# Patient Record
Sex: FEMALE | Race: WHITE | NOT HISPANIC OR LATINO | Employment: OTHER | URBAN - METROPOLITAN AREA
[De-identification: names, ages, dates, MRNs, and addresses within clinical notes are randomized per-mention and may not be internally consistent; named-entity substitution may affect disease eponyms.]

---

## 2017-02-08 ENCOUNTER — HISTORICAL (OUTPATIENT)
Dept: RADIOLOGY | Facility: HOSPITAL | Age: 67
End: 2017-02-08

## 2017-03-20 ENCOUNTER — HISTORICAL (OUTPATIENT)
Dept: RADIOLOGY | Facility: HOSPITAL | Age: 67
End: 2017-03-20

## 2017-04-03 ENCOUNTER — HISTORICAL (OUTPATIENT)
Dept: RADIOLOGY | Facility: HOSPITAL | Age: 67
End: 2017-04-03

## 2017-04-10 ENCOUNTER — HISTORICAL (OUTPATIENT)
Dept: LAB | Facility: HOSPITAL | Age: 67
End: 2017-04-10

## 2017-04-26 ENCOUNTER — HISTORICAL (OUTPATIENT)
Dept: ADMINISTRATIVE | Facility: HOSPITAL | Age: 67
End: 2017-04-26

## 2017-05-13 ENCOUNTER — HISTORICAL (OUTPATIENT)
Dept: LAB | Facility: HOSPITAL | Age: 67
End: 2017-05-13

## 2017-05-13 LAB
ALBUMIN SERPL-MCNC: 3.7 GM/DL (ref 3.4–5)
ALBUMIN/GLOB SERPL: 1.1 RATIO (ref 1.1–2)
ALP SERPL-CCNC: 100 UNIT/L (ref 46–116)
ALT SERPL-CCNC: 16 UNIT/L (ref 12–78)
AST SERPL-CCNC: 10 UNIT/L (ref 15–37)
BILIRUB SERPL-MCNC: 0.3 MG/DL (ref 0.2–1)
BILIRUBIN DIRECT+TOT PNL SERPL-MCNC: 0.07 MG/DL (ref 0–0.2)
BILIRUBIN DIRECT+TOT PNL SERPL-MCNC: 0.23 MG/DL (ref 0–0.8)
BUN SERPL-MCNC: 37 MG/DL (ref 7–18)
CALCIUM SERPL-MCNC: 9.6 MG/DL (ref 8.5–10.1)
CHLORIDE SERPL-SCNC: 104 MMOL/L (ref 98–107)
CO2 SERPL-SCNC: 26.6 MMOL/L (ref 21–32)
CREAT SERPL-MCNC: 1.03 MG/DL (ref 0.6–1.3)
EST. AVERAGE GLUCOSE BLD GHB EST-MCNC: 192 MG/DL
GLOBULIN SER-MCNC: 3.4 GM/DL (ref 2.4–3.5)
GLUCOSE SERPL-MCNC: 132 MG/DL (ref 74–106)
HBA1C MFR BLD: 8.3 % (ref 4.5–6.2)
POTASSIUM SERPL-SCNC: 5 MMOL/L (ref 3.5–5.1)
PROT SERPL-MCNC: 7.1 GM/DL (ref 6.4–8.2)
SODIUM SERPL-SCNC: 140 MMOL/L (ref 136–145)
TSH SERPL-ACNC: 0.12 MIU/ML (ref 0.36–3.74)

## 2017-06-19 ENCOUNTER — HISTORICAL (OUTPATIENT)
Dept: HEMATOLOGY/ONCOLOGY | Facility: CLINIC | Age: 67
End: 2017-06-19

## 2017-06-19 LAB
ABS NEUT (OLG): 4.09 X10(3)/MCL (ref 2.1–9.2)
ALBUMIN SERPL-MCNC: 3.4 GM/DL (ref 3.4–5)
ALBUMIN/GLOB SERPL: 1.1 RATIO (ref 1.1–2)
ALP SERPL-CCNC: 86 UNIT/L (ref 38–126)
ALT SERPL-CCNC: 18 UNIT/L (ref 12–78)
AST SERPL-CCNC: 7 UNIT/L (ref 15–37)
BASOPHILS # BLD AUTO: 0 X10(3)/MCL (ref 0–0.2)
BASOPHILS NFR BLD AUTO: 0.3 %
BILIRUB SERPL-MCNC: 0.3 MG/DL (ref 0.2–1)
BILIRUBIN DIRECT+TOT PNL SERPL-MCNC: 0.1 MG/DL (ref 0–0.5)
BILIRUBIN DIRECT+TOT PNL SERPL-MCNC: 0.2 MG/DL (ref 0–0.8)
BUN SERPL-MCNC: 35 MG/DL (ref 7–18)
CALCIUM SERPL-MCNC: 8.9 MG/DL (ref 8.5–10.1)
CHLORIDE SERPL-SCNC: 104 MMOL/L (ref 98–107)
CO2 SERPL-SCNC: 23 MMOL/L (ref 21–32)
CREAT SERPL-MCNC: 1.06 MG/DL (ref 0.55–1.02)
EOSINOPHIL # BLD AUTO: 0.2 X10(3)/MCL (ref 0–0.9)
EOSINOPHIL NFR BLD AUTO: 3.6 %
ERYTHROCYTE [DISTWIDTH] IN BLOOD BY AUTOMATED COUNT: 12 % (ref 11.5–17)
GLOBULIN SER-MCNC: 3.2 GM/DL (ref 2.4–3.5)
GLUCOSE SERPL-MCNC: 241 MG/DL (ref 74–106)
HCT VFR BLD AUTO: 30.4 % (ref 37–47)
HGB BLD-MCNC: 9.9 GM/DL (ref 12–16)
LYMPHOCYTES # BLD AUTO: 1.3 X10(3)/MCL (ref 0.6–4.6)
LYMPHOCYTES NFR BLD AUTO: 21.6 %
MCH RBC QN AUTO: 31 PG (ref 27–31)
MCHC RBC AUTO-ENTMCNC: 32.6 GM/DL (ref 33–36)
MCV RBC AUTO: 95.3 FL (ref 80–94)
MONOCYTES # BLD AUTO: 0.3 X10(3)/MCL (ref 0.1–1.3)
MONOCYTES NFR BLD AUTO: 4.9 %
NEUTROPHILS # BLD AUTO: 4.1 X10(3)/MCL (ref 2.1–9.2)
NEUTROPHILS NFR BLD AUTO: 69.6 %
PLATELET # BLD AUTO: 244 X10(3)/MCL (ref 130–400)
PMV BLD AUTO: 9.9 FL (ref 9.4–12.4)
POTASSIUM SERPL-SCNC: 4.8 MMOL/L (ref 3.5–5.1)
PROT SERPL-MCNC: 6.6 GM/DL (ref 6.4–8.2)
RBC # BLD AUTO: 3.19 X10(6)/MCL (ref 4.2–5.4)
SODIUM SERPL-SCNC: 137 MMOL/L (ref 136–145)
WBC # SPEC AUTO: 5.9 X10(3)/MCL (ref 4.5–11.5)

## 2017-06-26 ENCOUNTER — HISTORICAL (OUTPATIENT)
Dept: HEMATOLOGY/ONCOLOGY | Facility: CLINIC | Age: 67
End: 2017-06-26

## 2017-07-25 ENCOUNTER — HISTORICAL (OUTPATIENT)
Dept: RADIATION THERAPY | Facility: HOSPITAL | Age: 67
End: 2017-07-25

## 2017-08-02 ENCOUNTER — HISTORICAL (OUTPATIENT)
Dept: RADIATION THERAPY | Facility: HOSPITAL | Age: 67
End: 2017-08-02

## 2017-08-07 ENCOUNTER — HISTORICAL (OUTPATIENT)
Dept: RADIATION THERAPY | Facility: HOSPITAL | Age: 67
End: 2017-08-07

## 2017-08-08 ENCOUNTER — HISTORICAL (OUTPATIENT)
Dept: RADIATION THERAPY | Facility: HOSPITAL | Age: 67
End: 2017-08-08

## 2017-08-10 ENCOUNTER — HISTORICAL (OUTPATIENT)
Dept: RADIATION THERAPY | Facility: HOSPITAL | Age: 67
End: 2017-08-10

## 2017-08-14 ENCOUNTER — HISTORICAL (OUTPATIENT)
Dept: RADIATION THERAPY | Facility: HOSPITAL | Age: 67
End: 2017-08-14

## 2017-08-15 ENCOUNTER — HISTORICAL (OUTPATIENT)
Dept: RADIATION THERAPY | Facility: HOSPITAL | Age: 67
End: 2017-08-15

## 2017-08-16 ENCOUNTER — HISTORICAL (OUTPATIENT)
Dept: RADIATION THERAPY | Facility: HOSPITAL | Age: 67
End: 2017-08-16

## 2017-08-17 ENCOUNTER — HISTORICAL (OUTPATIENT)
Dept: RADIATION THERAPY | Facility: HOSPITAL | Age: 67
End: 2017-08-17

## 2017-08-18 ENCOUNTER — HISTORICAL (OUTPATIENT)
Dept: RADIATION THERAPY | Facility: HOSPITAL | Age: 67
End: 2017-08-18

## 2017-08-21 ENCOUNTER — HISTORICAL (OUTPATIENT)
Dept: RADIATION THERAPY | Facility: HOSPITAL | Age: 67
End: 2017-08-21

## 2017-08-22 ENCOUNTER — HISTORICAL (OUTPATIENT)
Dept: RADIATION THERAPY | Facility: HOSPITAL | Age: 67
End: 2017-08-22

## 2017-08-23 ENCOUNTER — HISTORICAL (OUTPATIENT)
Dept: RADIATION THERAPY | Facility: HOSPITAL | Age: 67
End: 2017-08-23

## 2017-08-24 ENCOUNTER — HISTORICAL (OUTPATIENT)
Dept: RADIATION THERAPY | Facility: HOSPITAL | Age: 67
End: 2017-08-24

## 2017-08-25 ENCOUNTER — HISTORICAL (OUTPATIENT)
Dept: RADIATION THERAPY | Facility: HOSPITAL | Age: 67
End: 2017-08-25

## 2017-08-28 ENCOUNTER — HISTORICAL (OUTPATIENT)
Dept: RADIATION THERAPY | Facility: HOSPITAL | Age: 67
End: 2017-08-28

## 2017-08-29 ENCOUNTER — HISTORICAL (OUTPATIENT)
Dept: RADIATION THERAPY | Facility: HOSPITAL | Age: 67
End: 2017-08-29

## 2017-08-30 ENCOUNTER — HISTORICAL (OUTPATIENT)
Dept: RADIATION THERAPY | Facility: HOSPITAL | Age: 67
End: 2017-08-30

## 2017-08-31 ENCOUNTER — HISTORICAL (OUTPATIENT)
Dept: RADIATION THERAPY | Facility: HOSPITAL | Age: 67
End: 2017-08-31

## 2017-09-01 ENCOUNTER — HISTORICAL (OUTPATIENT)
Dept: RADIATION THERAPY | Facility: HOSPITAL | Age: 67
End: 2017-09-01

## 2017-09-05 ENCOUNTER — HISTORICAL (OUTPATIENT)
Dept: RADIATION THERAPY | Facility: HOSPITAL | Age: 67
End: 2017-09-05

## 2017-09-06 ENCOUNTER — HISTORICAL (OUTPATIENT)
Dept: RADIATION THERAPY | Facility: HOSPITAL | Age: 67
End: 2017-09-06

## 2017-09-07 ENCOUNTER — HISTORICAL (OUTPATIENT)
Dept: RADIATION THERAPY | Facility: HOSPITAL | Age: 67
End: 2017-09-07

## 2017-09-08 ENCOUNTER — HISTORICAL (OUTPATIENT)
Dept: RADIATION THERAPY | Facility: HOSPITAL | Age: 67
End: 2017-09-08

## 2017-09-11 ENCOUNTER — HISTORICAL (OUTPATIENT)
Dept: RADIATION THERAPY | Facility: HOSPITAL | Age: 67
End: 2017-09-11

## 2017-09-12 ENCOUNTER — HISTORICAL (OUTPATIENT)
Dept: RADIATION THERAPY | Facility: HOSPITAL | Age: 67
End: 2017-09-12

## 2017-09-13 ENCOUNTER — HISTORICAL (OUTPATIENT)
Dept: RADIATION THERAPY | Facility: HOSPITAL | Age: 67
End: 2017-09-13

## 2017-12-11 ENCOUNTER — HISTORICAL (OUTPATIENT)
Dept: RADIATION THERAPY | Facility: HOSPITAL | Age: 67
End: 2017-12-11

## 2017-12-20 ENCOUNTER — HISTORICAL (OUTPATIENT)
Dept: HEMATOLOGY/ONCOLOGY | Facility: CLINIC | Age: 67
End: 2017-12-20

## 2017-12-20 LAB
ABS NEUT (OLG): 3.42 X10(3)/MCL (ref 2.1–9.2)
ALBUMIN SERPL-MCNC: 3.6 GM/DL (ref 3.4–5)
ALBUMIN/GLOB SERPL: 1.1 RATIO (ref 1.1–2)
ALP SERPL-CCNC: 85 UNIT/L (ref 38–126)
ALT SERPL-CCNC: 14 UNIT/L (ref 12–78)
AST SERPL-CCNC: 8 UNIT/L (ref 15–37)
BASOPHILS # BLD AUTO: 0 X10(3)/MCL (ref 0–0.2)
BASOPHILS NFR BLD AUTO: 0.7 %
BILIRUB SERPL-MCNC: 0.3 MG/DL (ref 0.2–1)
BILIRUBIN DIRECT+TOT PNL SERPL-MCNC: 0.1 MG/DL (ref 0–0.5)
BILIRUBIN DIRECT+TOT PNL SERPL-MCNC: 0.2 MG/DL (ref 0–0.8)
BUN SERPL-MCNC: 26 MG/DL (ref 7–18)
CALCIUM SERPL-MCNC: 8.9 MG/DL (ref 8.5–10.1)
CHLORIDE SERPL-SCNC: 102 MMOL/L (ref 98–107)
CO2 SERPL-SCNC: 23 MMOL/L (ref 21–32)
CREAT SERPL-MCNC: 0.97 MG/DL (ref 0.55–1.02)
EOSINOPHIL # BLD AUTO: 0.4 X10(3)/MCL (ref 0–0.9)
EOSINOPHIL NFR BLD AUTO: 7.5 %
ERYTHROCYTE [DISTWIDTH] IN BLOOD BY AUTOMATED COUNT: 12.5 % (ref 11.5–17)
GLOBULIN SER-MCNC: 3.2 GM/DL (ref 2.4–3.5)
GLUCOSE SERPL-MCNC: 321 MG/DL (ref 74–106)
HCT VFR BLD AUTO: 31.4 % (ref 37–47)
HGB BLD-MCNC: 10.1 GM/DL (ref 12–16)
LYMPHOCYTES # BLD AUTO: 1.3 X10(3)/MCL (ref 0.6–4.6)
LYMPHOCYTES NFR BLD AUTO: 23.4 %
MCH RBC QN AUTO: 31.4 PG (ref 27–31)
MCHC RBC AUTO-ENTMCNC: 32.2 GM/DL (ref 33–36)
MCV RBC AUTO: 97.5 FL (ref 80–94)
MONOCYTES # BLD AUTO: 0.3 X10(3)/MCL (ref 0.1–1.3)
MONOCYTES NFR BLD AUTO: 5.9 %
NEUTROPHILS # BLD AUTO: 3.4 X10(3)/MCL (ref 2.1–9.2)
NEUTROPHILS NFR BLD AUTO: 62.5 %
PLATELET # BLD AUTO: 243 X10(3)/MCL (ref 130–400)
PMV BLD AUTO: 9.3 FL (ref 9.4–12.4)
POTASSIUM SERPL-SCNC: 4.3 MMOL/L (ref 3.5–5.1)
PROT SERPL-MCNC: 6.8 GM/DL (ref 6.4–8.2)
RBC # BLD AUTO: 3.22 X10(6)/MCL (ref 4.2–5.4)
SODIUM SERPL-SCNC: 136 MMOL/L (ref 136–145)
WBC # SPEC AUTO: 5.5 X10(3)/MCL (ref 4.5–11.5)

## 2018-01-22 ENCOUNTER — HISTORICAL (OUTPATIENT)
Dept: HEMATOLOGY/ONCOLOGY | Facility: CLINIC | Age: 68
End: 2018-01-22

## 2018-01-22 LAB
ABS NEUT (OLG): 4.42 X10(3)/MCL (ref 2.1–9.2)
ALBUMIN SERPL-MCNC: 3.6 GM/DL (ref 3.4–5)
ALBUMIN/GLOB SERPL: 1 {RATIO}
ALP SERPL-CCNC: 100 UNIT/L (ref 38–126)
ALT SERPL-CCNC: 13 UNIT/L (ref 12–78)
AST SERPL-CCNC: 8 UNIT/L (ref 15–37)
BASOPHILS # BLD AUTO: 0 X10(3)/MCL (ref 0–0.2)
BASOPHILS NFR BLD AUTO: 0.3 %
BILIRUB SERPL-MCNC: 0.3 MG/DL (ref 0.2–1)
BILIRUBIN DIRECT+TOT PNL SERPL-MCNC: 0.1 MG/DL (ref 0–0.2)
BILIRUBIN DIRECT+TOT PNL SERPL-MCNC: 0.2 MG/DL (ref 0–0.8)
BUN SERPL-MCNC: 26 MG/DL (ref 7–18)
CALCIUM SERPL-MCNC: 9.1 MG/DL (ref 8.5–10.1)
CHLORIDE SERPL-SCNC: 103 MMOL/L (ref 98–107)
CO2 SERPL-SCNC: 26 MMOL/L (ref 21–32)
CREAT SERPL-MCNC: 0.97 MG/DL (ref 0.55–1.02)
EOSINOPHIL # BLD AUTO: 0.4 X10(3)/MCL (ref 0–0.9)
EOSINOPHIL NFR BLD AUTO: 6.1 %
ERYTHROCYTE [DISTWIDTH] IN BLOOD BY AUTOMATED COUNT: 12.1 % (ref 11.5–17)
GLOBULIN SER-MCNC: 3.6 GM/DL (ref 2.4–3.5)
GLUCOSE SERPL-MCNC: 183 MG/DL (ref 74–106)
HCT VFR BLD AUTO: 33 % (ref 37–47)
HGB BLD-MCNC: 10.6 GM/DL (ref 12–16)
LYMPHOCYTES # BLD AUTO: 1 X10(3)/MCL (ref 0.6–4.6)
LYMPHOCYTES NFR BLD AUTO: 16.9 %
MCH RBC QN AUTO: 31.2 PG (ref 27–31)
MCHC RBC AUTO-ENTMCNC: 32.1 GM/DL (ref 33–36)
MCV RBC AUTO: 97.1 FL (ref 80–94)
MONOCYTES # BLD AUTO: 0.3 X10(3)/MCL (ref 0.1–1.3)
MONOCYTES NFR BLD AUTO: 5.5 %
NEUTROPHILS # BLD AUTO: 4.4 X10(3)/MCL (ref 2.1–9.2)
NEUTROPHILS NFR BLD AUTO: 71.2 %
PLATELET # BLD AUTO: 249 X10(3)/MCL (ref 130–400)
PMV BLD AUTO: 9.3 FL (ref 9.4–12.4)
POTASSIUM SERPL-SCNC: 4.5 MMOL/L (ref 3.5–5.1)
PROT SERPL-MCNC: 7.2 GM/DL (ref 6.4–8.2)
RBC # BLD AUTO: 3.4 X10(6)/MCL (ref 4.2–5.4)
SODIUM SERPL-SCNC: 138 MMOL/L (ref 136–145)
WBC # SPEC AUTO: 6.2 X10(3)/MCL (ref 4.5–11.5)

## 2018-04-11 ENCOUNTER — HISTORICAL (OUTPATIENT)
Dept: RADIATION THERAPY | Facility: HOSPITAL | Age: 68
End: 2018-04-11

## 2018-07-17 ENCOUNTER — HISTORICAL (OUTPATIENT)
Dept: ADMINISTRATIVE | Facility: HOSPITAL | Age: 68
End: 2018-07-17

## 2018-07-17 LAB
ABS NEUT (OLG): 3.59 X10(3)/MCL (ref 2.1–9.2)
ALBUMIN SERPL-MCNC: 3.4 GM/DL (ref 3.4–5)
ALBUMIN/GLOB SERPL: 0.9 {RATIO}
ALP SERPL-CCNC: 109 UNIT/L (ref 38–126)
ALT SERPL-CCNC: 16 UNIT/L (ref 12–78)
AST SERPL-CCNC: 10 UNIT/L (ref 15–37)
BASOPHILS # BLD AUTO: 0 X10(3)/MCL (ref 0–0.2)
BASOPHILS NFR BLD AUTO: 0.5 %
BILIRUB SERPL-MCNC: 0.5 MG/DL (ref 0.2–1)
BILIRUBIN DIRECT+TOT PNL SERPL-MCNC: 0.1 MG/DL (ref 0–0.2)
BILIRUBIN DIRECT+TOT PNL SERPL-MCNC: 0.4 MG/DL (ref 0–0.8)
BUN SERPL-MCNC: 29 MG/DL (ref 7–18)
CALCIUM SERPL-MCNC: 9.3 MG/DL (ref 8.5–10.1)
CHLORIDE SERPL-SCNC: 102 MMOL/L (ref 98–107)
CO2 SERPL-SCNC: 25 MMOL/L (ref 21–32)
CREAT SERPL-MCNC: 1.12 MG/DL (ref 0.55–1.02)
EOSINOPHIL # BLD AUTO: 0.6 X10(3)/MCL (ref 0–0.9)
EOSINOPHIL NFR BLD AUTO: 9.8 %
ERYTHROCYTE [DISTWIDTH] IN BLOOD BY AUTOMATED COUNT: 11.7 % (ref 11.5–17)
GLOBULIN SER-MCNC: 3.7 GM/DL (ref 2.4–3.5)
GLUCOSE SERPL-MCNC: 250 MG/DL (ref 74–106)
HCT VFR BLD AUTO: 34.1 % (ref 37–47)
HGB BLD-MCNC: 11.2 GM/DL (ref 12–16)
LYMPHOCYTES # BLD AUTO: 1.3 X10(3)/MCL (ref 0.6–4.6)
LYMPHOCYTES NFR BLD AUTO: 22.2 %
MCH RBC QN AUTO: 31.3 PG (ref 27–31)
MCHC RBC AUTO-ENTMCNC: 32.8 GM/DL (ref 33–36)
MCV RBC AUTO: 95.3 FL (ref 80–94)
MONOCYTES # BLD AUTO: 0.4 X10(3)/MCL (ref 0.1–1.3)
MONOCYTES NFR BLD AUTO: 6.6 %
NEUTROPHILS # BLD AUTO: 3.6 X10(3)/MCL (ref 2.1–9.2)
NEUTROPHILS NFR BLD AUTO: 60.9 %
PLATELET # BLD AUTO: 234 X10(3)/MCL (ref 130–400)
PMV BLD AUTO: 9.4 FL (ref 9.4–12.4)
POTASSIUM SERPL-SCNC: 4.6 MMOL/L (ref 3.5–5.1)
PROT SERPL-MCNC: 7.1 GM/DL (ref 6.4–8.2)
RBC # BLD AUTO: 3.58 X10(6)/MCL (ref 4.2–5.4)
SODIUM SERPL-SCNC: 137 MMOL/L (ref 136–145)
WBC # SPEC AUTO: 5.9 X10(3)/MCL (ref 4.5–11.5)

## 2018-08-20 ENCOUNTER — HISTORICAL (OUTPATIENT)
Dept: RADIATION THERAPY | Facility: HOSPITAL | Age: 68
End: 2018-08-20

## 2018-12-19 ENCOUNTER — HISTORICAL (OUTPATIENT)
Dept: LAB | Facility: HOSPITAL | Age: 68
End: 2018-12-19

## 2019-02-05 ENCOUNTER — HISTORICAL (OUTPATIENT)
Dept: HEMATOLOGY/ONCOLOGY | Facility: CLINIC | Age: 69
End: 2019-02-05

## 2019-02-05 LAB
ABS NEUT (OLG): 5.68 X10(3)/MCL (ref 2.1–9.2)
ALBUMIN SERPL-MCNC: 3.8 GM/DL (ref 3.4–5)
ALBUMIN/GLOB SERPL: 1.1 {RATIO}
ALP SERPL-CCNC: 113 UNIT/L (ref 38–126)
ALT SERPL-CCNC: 20 UNIT/L (ref 12–78)
AST SERPL-CCNC: 11 UNIT/L (ref 15–37)
BASOPHILS # BLD AUTO: 0 X10(3)/MCL (ref 0–0.2)
BASOPHILS NFR BLD AUTO: 0.4 %
BILIRUB SERPL-MCNC: 0.6 MG/DL (ref 0.2–1)
BILIRUBIN DIRECT+TOT PNL SERPL-MCNC: 0.1 MG/DL (ref 0–0.2)
BILIRUBIN DIRECT+TOT PNL SERPL-MCNC: 0.5 MG/DL (ref 0–0.8)
BUN SERPL-MCNC: 30 MG/DL (ref 7–18)
CALCIUM SERPL-MCNC: 9.3 MG/DL (ref 8.5–10.1)
CHLORIDE SERPL-SCNC: 107 MMOL/L (ref 98–107)
CO2 SERPL-SCNC: 25 MMOL/L (ref 21–32)
CREAT SERPL-MCNC: 0.97 MG/DL (ref 0.55–1.02)
EOSINOPHIL # BLD AUTO: 0.3 X10(3)/MCL (ref 0–0.9)
EOSINOPHIL NFR BLD AUTO: 3.6 %
ERYTHROCYTE [DISTWIDTH] IN BLOOD BY AUTOMATED COUNT: 11.9 % (ref 11.5–17)
GLOBULIN SER-MCNC: 3.5 GM/DL (ref 2.4–3.5)
GLUCOSE SERPL-MCNC: 120 MG/DL (ref 74–106)
HCT VFR BLD AUTO: 34.7 % (ref 37–47)
HGB BLD-MCNC: 11.1 GM/DL (ref 12–16)
LYMPHOCYTES # BLD AUTO: 1.6 X10(3)/MCL (ref 0.6–4.6)
LYMPHOCYTES NFR BLD AUTO: 20.1 %
MCH RBC QN AUTO: 30.3 PG (ref 27–31)
MCHC RBC AUTO-ENTMCNC: 32 GM/DL (ref 33–36)
MCV RBC AUTO: 94.8 FL (ref 80–94)
MONOCYTES # BLD AUTO: 0.4 X10(3)/MCL (ref 0.1–1.3)
MONOCYTES NFR BLD AUTO: 5.5 %
NEUTROPHILS # BLD AUTO: 5.7 X10(3)/MCL (ref 2.1–9.2)
NEUTROPHILS NFR BLD AUTO: 70.3 %
PLATELET # BLD AUTO: 270 X10(3)/MCL (ref 130–400)
PMV BLD AUTO: 9.7 FL (ref 9.4–12.4)
POTASSIUM SERPL-SCNC: 4.4 MMOL/L (ref 3.5–5.1)
PROT SERPL-MCNC: 7.3 GM/DL (ref 6.4–8.2)
RBC # BLD AUTO: 3.66 X10(6)/MCL (ref 4.2–5.4)
SODIUM SERPL-SCNC: 139 MMOL/L (ref 136–145)
WBC # SPEC AUTO: 8.1 X10(3)/MCL (ref 4.5–11.5)

## 2019-02-18 ENCOUNTER — HISTORICAL (OUTPATIENT)
Dept: RADIATION THERAPY | Facility: HOSPITAL | Age: 69
End: 2019-02-18

## 2019-04-06 ENCOUNTER — HISTORICAL (OUTPATIENT)
Dept: RADIOLOGY | Facility: HOSPITAL | Age: 69
End: 2019-04-06

## 2019-08-21 ENCOUNTER — HISTORICAL (OUTPATIENT)
Dept: RADIATION THERAPY | Facility: HOSPITAL | Age: 69
End: 2019-08-21

## 2019-08-21 ENCOUNTER — HISTORICAL (OUTPATIENT)
Dept: ADMINISTRATIVE | Facility: HOSPITAL | Age: 69
End: 2019-08-21

## 2019-08-21 LAB
ABS NEUT (OLG): 5.46 X10(3)/MCL (ref 2.1–9.2)
ALBUMIN SERPL-MCNC: 3.8 GM/DL (ref 3.4–5)
ALBUMIN/GLOB SERPL: 1.3 {RATIO}
ALP SERPL-CCNC: 87 UNIT/L (ref 38–126)
ALT SERPL-CCNC: 17 UNIT/L (ref 12–78)
AST SERPL-CCNC: 11 UNIT/L (ref 15–37)
BASOPHILS # BLD AUTO: 0 X10(3)/MCL (ref 0–0.2)
BASOPHILS NFR BLD AUTO: 0.6 %
BILIRUB SERPL-MCNC: 0.6 MG/DL (ref 0.2–1)
BILIRUBIN DIRECT+TOT PNL SERPL-MCNC: 0.1 MG/DL (ref 0–0.2)
BILIRUBIN DIRECT+TOT PNL SERPL-MCNC: 0.5 MG/DL (ref 0–0.8)
BUN SERPL-MCNC: 29 MG/DL (ref 7–18)
CALCIUM SERPL-MCNC: 9.9 MG/DL (ref 8.5–10.1)
CHLORIDE SERPL-SCNC: 103 MMOL/L (ref 98–107)
CO2 SERPL-SCNC: 28 MMOL/L (ref 21–32)
CREAT SERPL-MCNC: 1.16 MG/DL (ref 0.55–1.02)
EOSINOPHIL # BLD AUTO: 0.2 X10(3)/MCL (ref 0–0.9)
EOSINOPHIL NFR BLD AUTO: 2.3 %
ERYTHROCYTE [DISTWIDTH] IN BLOOD BY AUTOMATED COUNT: 12.4 % (ref 11.5–17)
GLOBULIN SER-MCNC: 3 GM/DL (ref 2.4–3.5)
GLUCOSE SERPL-MCNC: 174 MG/DL (ref 74–106)
HCT VFR BLD AUTO: 34.6 % (ref 37–47)
HGB BLD-MCNC: 11.2 GM/DL (ref 12–16)
LYMPHOCYTES # BLD AUTO: 1.5 X10(3)/MCL (ref 0.6–4.6)
LYMPHOCYTES NFR BLD AUTO: 19.5 %
MCH RBC QN AUTO: 30.7 PG (ref 27–31)
MCHC RBC AUTO-ENTMCNC: 32.4 GM/DL (ref 33–36)
MCV RBC AUTO: 94.8 FL (ref 80–94)
MONOCYTES # BLD AUTO: 0.5 X10(3)/MCL (ref 0.1–1.3)
MONOCYTES NFR BLD AUTO: 6.7 %
NEUTROPHILS # BLD AUTO: 5.5 X10(3)/MCL (ref 2.1–9.2)
NEUTROPHILS NFR BLD AUTO: 70.8 %
PLATELET # BLD AUTO: 254 X10(3)/MCL (ref 130–400)
PMV BLD AUTO: 9.7 FL (ref 9.4–12.4)
POTASSIUM SERPL-SCNC: 4.2 MMOL/L (ref 3.5–5.1)
PROT SERPL-MCNC: 6.8 GM/DL (ref 6.4–8.2)
RBC # BLD AUTO: 3.65 X10(6)/MCL (ref 4.2–5.4)
SODIUM SERPL-SCNC: 138 MMOL/L (ref 136–145)
WBC # SPEC AUTO: 7.7 X10(3)/MCL (ref 4.5–11.5)

## 2019-08-26 ENCOUNTER — HISTORICAL (OUTPATIENT)
Dept: ADMINISTRATIVE | Facility: HOSPITAL | Age: 69
End: 2019-08-26

## 2019-08-26 LAB
FERRITIN SERPL-MCNC: 127.2 NG/ML (ref 8–388)
FOLATE SERPL-MCNC: 53.4 NG/ML (ref 3.1–17.5)
IRON SATN MFR SERPL: 21.1 % (ref 20–50)
IRON SERPL-MCNC: 70 MCG/DL (ref 50–175)
LDH SERPL-CCNC: 152 UNIT/L (ref 84–246)
RHEUMATOID FACT SERPL-ACNC: <10 IU/ML (ref 0–15)
TIBC SERPL-MCNC: 332 MCG/DL (ref 250–450)
TRANSFERRIN SERPL-MCNC: 248 MG/DL (ref 200–360)
TSH SERPL-ACNC: 1.07 MIU/L (ref 0.36–3.74)
VIT B12 SERPL-MCNC: 698 PG/ML (ref 193–986)

## 2020-02-13 ENCOUNTER — HISTORICAL (OUTPATIENT)
Dept: RADIOLOGY | Facility: HOSPITAL | Age: 70
End: 2020-02-13

## 2020-02-17 ENCOUNTER — HISTORICAL (OUTPATIENT)
Dept: HEMATOLOGY/ONCOLOGY | Facility: CLINIC | Age: 70
End: 2020-02-17

## 2020-02-17 LAB
ABS NEUT (OLG): 5.6 X10(3)/MCL (ref 2.1–9.2)
ALBUMIN SERPL-MCNC: 3.6 GM/DL (ref 3.4–5)
ALBUMIN/GLOB SERPL: 1.1 {RATIO}
ALP SERPL-CCNC: 91 UNIT/L (ref 38–126)
ALT SERPL-CCNC: 20 UNIT/L (ref 12–78)
AST SERPL-CCNC: 14 UNIT/L (ref 15–37)
BASOPHILS # BLD AUTO: 0 X10(3)/MCL (ref 0–0.2)
BASOPHILS NFR BLD AUTO: 0.3 %
BILIRUB SERPL-MCNC: 0.6 MG/DL (ref 0.2–1)
BILIRUBIN DIRECT+TOT PNL SERPL-MCNC: 0.1 MG/DL (ref 0–0.2)
BILIRUBIN DIRECT+TOT PNL SERPL-MCNC: 0.5 MG/DL (ref 0–0.8)
BUN SERPL-MCNC: 29 MG/DL (ref 7–18)
CALCIUM SERPL-MCNC: 9.3 MG/DL (ref 8.5–10.1)
CHLORIDE SERPL-SCNC: 104 MMOL/L (ref 98–107)
CO2 SERPL-SCNC: 25 MMOL/L (ref 21–32)
CREAT SERPL-MCNC: 1.12 MG/DL (ref 0.55–1.02)
EOSINOPHIL # BLD AUTO: 0.2 X10(3)/MCL (ref 0–0.9)
EOSINOPHIL NFR BLD AUTO: 2.6 %
ERYTHROCYTE [DISTWIDTH] IN BLOOD BY AUTOMATED COUNT: 12.3 % (ref 11.5–17)
GLOBULIN SER-MCNC: 3.2 GM/DL (ref 2.4–3.5)
GLUCOSE SERPL-MCNC: 201 MG/DL (ref 74–106)
HCT VFR BLD AUTO: 31.3 % (ref 37–47)
HGB BLD-MCNC: 10 GM/DL (ref 12–16)
LYMPHOCYTES # BLD AUTO: 1.4 X10(3)/MCL (ref 0.6–4.6)
LYMPHOCYTES NFR BLD AUTO: 18.4 %
MCH RBC QN AUTO: 31 PG (ref 27–31)
MCHC RBC AUTO-ENTMCNC: 31.9 GM/DL (ref 33–36)
MCV RBC AUTO: 96.9 FL (ref 80–94)
MONOCYTES # BLD AUTO: 0.4 X10(3)/MCL (ref 0.1–1.3)
MONOCYTES NFR BLD AUTO: 5.8 %
NEUTROPHILS # BLD AUTO: 5.6 X10(3)/MCL (ref 2.1–9.2)
NEUTROPHILS NFR BLD AUTO: 72.8 %
PLATELET # BLD AUTO: 234 X10(3)/MCL (ref 130–400)
PMV BLD AUTO: 9.7 FL (ref 9.4–12.4)
POTASSIUM SERPL-SCNC: 4.7 MMOL/L (ref 3.5–5.1)
PROT SERPL-MCNC: 6.8 GM/DL (ref 6.4–8.2)
RBC # BLD AUTO: 3.23 X10(6)/MCL (ref 4.2–5.4)
SODIUM SERPL-SCNC: 138 MMOL/L (ref 136–145)
WBC # SPEC AUTO: 7.7 X10(3)/MCL (ref 4.5–11.5)

## 2020-02-24 ENCOUNTER — HISTORICAL (OUTPATIENT)
Dept: HEMATOLOGY/ONCOLOGY | Facility: CLINIC | Age: 70
End: 2020-02-24

## 2020-03-03 ENCOUNTER — HISTORICAL (OUTPATIENT)
Dept: RADIOLOGY | Facility: HOSPITAL | Age: 70
End: 2020-03-03

## 2020-10-06 ENCOUNTER — HISTORICAL (OUTPATIENT)
Dept: ADMINISTRATIVE | Facility: HOSPITAL | Age: 70
End: 2020-10-06

## 2020-10-06 LAB
ABS NEUT (OLG): 10.45 X10(3)/MCL (ref 2.1–9.2)
ALBUMIN SERPL-MCNC: 4.4 GM/DL (ref 3.4–4.8)
ALBUMIN/GLOB SERPL: 1.4 RATIO (ref 1.1–2)
ALP SERPL-CCNC: 95 UNIT/L (ref 40–150)
ALT SERPL-CCNC: 29 UNIT/L (ref 0–55)
AST SERPL-CCNC: 25 UNIT/L (ref 5–34)
BASOPHILS # BLD AUTO: 0 X10(3)/MCL (ref 0–0.2)
BASOPHILS NFR BLD AUTO: 0.3 %
BILIRUB SERPL-MCNC: 0.4 MG/DL
BILIRUBIN DIRECT+TOT PNL SERPL-MCNC: 0.2 MG/DL (ref 0–0.5)
BILIRUBIN DIRECT+TOT PNL SERPL-MCNC: 0.2 MG/DL (ref 0–0.8)
BUN SERPL-MCNC: 36.4 MG/DL (ref 9.8–20.1)
CALCIUM SERPL-MCNC: 10.1 MG/DL (ref 8.4–10.2)
CHLORIDE SERPL-SCNC: 98 MMOL/L (ref 98–107)
CO2 SERPL-SCNC: 22 MMOL/L (ref 23–31)
CREAT SERPL-MCNC: 1.34 MG/DL (ref 0.55–1.02)
EOSINOPHIL # BLD AUTO: 0 X10(3)/MCL (ref 0–0.9)
EOSINOPHIL NFR BLD AUTO: 0.1 %
ERYTHROCYTE [DISTWIDTH] IN BLOOD BY AUTOMATED COUNT: 12.8 % (ref 11.5–17)
GLOBULIN SER-MCNC: 3.2 GM/DL (ref 2.4–3.5)
GLUCOSE SERPL-MCNC: 212 MG/DL (ref 82–115)
HCT VFR BLD AUTO: 33.2 % (ref 37–47)
HGB BLD-MCNC: 11 GM/DL (ref 12–16)
LYMPHOCYTES # BLD AUTO: 0.8 X10(3)/MCL (ref 0.6–4.6)
LYMPHOCYTES NFR BLD AUTO: 6.7 %
MCH RBC QN AUTO: 30.6 PG (ref 27–31)
MCHC RBC AUTO-ENTMCNC: 33.1 GM/DL (ref 33–36)
MCV RBC AUTO: 92.5 FL (ref 80–94)
MONOCYTES # BLD AUTO: 0.2 X10(3)/MCL (ref 0.1–1.3)
MONOCYTES NFR BLD AUTO: 2.1 %
NEUTROPHILS # BLD AUTO: 10.4 X10(3)/MCL (ref 2.1–9.2)
NEUTROPHILS NFR BLD AUTO: 90.5 %
PLATELET # BLD AUTO: 250 X10(3)/MCL (ref 130–400)
PMV BLD AUTO: 9.4 FL (ref 9.4–12.4)
POTASSIUM SERPL-SCNC: 4.9 MMOL/L (ref 3.5–5.1)
PROT SERPL-MCNC: 7.6 GM/DL (ref 5.8–7.6)
RBC # BLD AUTO: 3.59 X10(6)/MCL (ref 4.2–5.4)
SODIUM SERPL-SCNC: 136 MMOL/L (ref 136–145)
WBC # SPEC AUTO: 11.5 X10(3)/MCL (ref 4.5–11.5)

## 2020-10-12 ENCOUNTER — HISTORICAL (OUTPATIENT)
Dept: HEMATOLOGY/ONCOLOGY | Facility: CLINIC | Age: 70
End: 2020-10-12

## 2020-10-27 ENCOUNTER — HISTORICAL (OUTPATIENT)
Dept: RADIATION THERAPY | Facility: HOSPITAL | Age: 70
End: 2020-10-27

## 2021-04-06 ENCOUNTER — HISTORICAL (OUTPATIENT)
Dept: HEMATOLOGY/ONCOLOGY | Facility: CLINIC | Age: 71
End: 2021-04-06

## 2021-04-06 LAB
ABS NEUT (OLG): 4.48 X10(3)/MCL (ref 2.1–9.2)
ALBUMIN SERPL-MCNC: 4 GM/DL (ref 3.4–4.8)
ALBUMIN/GLOB SERPL: 1.2 RATIO (ref 1.1–2)
ALP SERPL-CCNC: 87 UNIT/L (ref 40–150)
ALT SERPL-CCNC: 17 UNIT/L (ref 0–55)
AST SERPL-CCNC: 17 UNIT/L (ref 5–34)
BASOPHILS # BLD AUTO: 0 X10(3)/MCL (ref 0–0.2)
BASOPHILS NFR BLD AUTO: 0.6 %
BILIRUB SERPL-MCNC: 0.3 MG/DL
BILIRUBIN DIRECT+TOT PNL SERPL-MCNC: 0.1 MG/DL (ref 0–0.8)
BILIRUBIN DIRECT+TOT PNL SERPL-MCNC: 0.2 MG/DL (ref 0–0.5)
BUN SERPL-MCNC: 43 MG/DL (ref 9.8–20.1)
CALCIUM SERPL-MCNC: 10.9 MG/DL (ref 8.4–10.2)
CHLORIDE SERPL-SCNC: 103 MMOL/L (ref 98–107)
CO2 SERPL-SCNC: 24 MMOL/L (ref 23–31)
CREAT SERPL-MCNC: 1.29 MG/DL (ref 0.55–1.02)
EOSINOPHIL # BLD AUTO: 0.2 X10(3)/MCL (ref 0–0.9)
EOSINOPHIL NFR BLD AUTO: 3.1 %
ERYTHROCYTE [DISTWIDTH] IN BLOOD BY AUTOMATED COUNT: 12.7 % (ref 11.5–17)
GLOBULIN SER-MCNC: 3.3 GM/DL (ref 2.4–3.5)
GLUCOSE SERPL-MCNC: 246 MG/DL (ref 82–115)
HCT VFR BLD AUTO: 33.4 % (ref 37–47)
HGB BLD-MCNC: 10.8 GM/DL (ref 12–16)
LYMPHOCYTES # BLD AUTO: 1.9 X10(3)/MCL (ref 0.6–4.6)
LYMPHOCYTES NFR BLD AUTO: 26.1 %
MCH RBC QN AUTO: 30.4 PG (ref 27–31)
MCHC RBC AUTO-ENTMCNC: 32.3 GM/DL (ref 33–36)
MCV RBC AUTO: 94.1 FL (ref 80–94)
MONOCYTES # BLD AUTO: 0.5 X10(3)/MCL (ref 0.1–1.3)
MONOCYTES NFR BLD AUTO: 7.3 %
NEUTROPHILS # BLD AUTO: 4.5 X10(3)/MCL (ref 2.1–9.2)
NEUTROPHILS NFR BLD AUTO: 62.8 %
PLATELET # BLD AUTO: 247 X10(3)/MCL (ref 130–400)
PMV BLD AUTO: 9.8 FL (ref 9.4–12.4)
POTASSIUM SERPL-SCNC: 4.9 MMOL/L (ref 3.5–5.1)
PROT SERPL-MCNC: 7.3 GM/DL (ref 5.8–7.6)
RBC # BLD AUTO: 3.55 X10(6)/MCL (ref 4.2–5.4)
SODIUM SERPL-SCNC: 138 MMOL/L (ref 136–145)
WBC # SPEC AUTO: 7.1 X10(3)/MCL (ref 4.5–11.5)

## 2021-04-09 ENCOUNTER — HISTORICAL (OUTPATIENT)
Dept: RADIOLOGY | Facility: HOSPITAL | Age: 71
End: 2021-04-09

## 2021-05-04 ENCOUNTER — HISTORICAL (OUTPATIENT)
Dept: INFUSION THERAPY | Facility: HOSPITAL | Age: 71
End: 2021-05-04

## 2021-10-08 ENCOUNTER — HISTORICAL (OUTPATIENT)
Dept: HEMATOLOGY/ONCOLOGY | Facility: CLINIC | Age: 71
End: 2021-10-08

## 2021-10-08 LAB
ABS NEUT (OLG): 3.36 X10(3)/MCL (ref 2.1–9.2)
ALBUMIN SERPL-MCNC: 3.7 GM/DL (ref 3.4–4.8)
ALBUMIN/GLOB SERPL: 1.2 RATIO (ref 1.1–2)
ALP SERPL-CCNC: 95 UNIT/L (ref 40–150)
ALT SERPL-CCNC: 26 UNIT/L (ref 0–55)
AST SERPL-CCNC: 23 UNIT/L (ref 5–34)
BASOPHILS # BLD AUTO: 0 X10(3)/MCL (ref 0–0.2)
BASOPHILS NFR BLD AUTO: 0.5 %
BILIRUB SERPL-MCNC: 0.3 MG/DL
BILIRUBIN DIRECT+TOT PNL SERPL-MCNC: 0.1 MG/DL (ref 0–0.8)
BILIRUBIN DIRECT+TOT PNL SERPL-MCNC: 0.2 MG/DL (ref 0–0.5)
BUN SERPL-MCNC: 31.4 MG/DL (ref 9.8–20.1)
CALCIUM SERPL-MCNC: 9.2 MG/DL (ref 8.4–10.2)
CHLORIDE SERPL-SCNC: 109 MMOL/L (ref 98–107)
CO2 SERPL-SCNC: 24 MMOL/L (ref 23–31)
CREAT SERPL-MCNC: 1.28 MG/DL (ref 0.55–1.02)
EOSINOPHIL # BLD AUTO: 0.2 X10(3)/MCL (ref 0–0.9)
EOSINOPHIL NFR BLD AUTO: 4.5 %
ERYTHROCYTE [DISTWIDTH] IN BLOOD BY AUTOMATED COUNT: 12.7 % (ref 11.5–17)
GLOBULIN SER-MCNC: 3.1 GM/DL (ref 2.4–3.5)
GLUCOSE SERPL-MCNC: 191 MG/DL (ref 82–115)
HCT VFR BLD AUTO: 33.9 % (ref 37–47)
HGB BLD-MCNC: 10.9 GM/DL (ref 12–16)
LYMPHOCYTES # BLD AUTO: 1.5 X10(3)/MCL (ref 0.6–4.6)
LYMPHOCYTES NFR BLD AUTO: 27.5 %
MCH RBC QN AUTO: 31.5 PG (ref 27–31)
MCHC RBC AUTO-ENTMCNC: 32.2 GM/DL (ref 33–36)
MCV RBC AUTO: 98 FL (ref 80–94)
MONOCYTES # BLD AUTO: 0.4 X10(3)/MCL (ref 0.1–1.3)
MONOCYTES NFR BLD AUTO: 7.3 %
NEUTROPHILS # BLD AUTO: 3.4 X10(3)/MCL (ref 2.1–9.2)
NEUTROPHILS NFR BLD AUTO: 60 %
PLATELET # BLD AUTO: 217 X10(3)/MCL (ref 130–400)
PMV BLD AUTO: 10.1 FL (ref 9.4–12.4)
POTASSIUM SERPL-SCNC: 4.7 MMOL/L (ref 3.5–5.1)
PROT SERPL-MCNC: 6.8 GM/DL (ref 5.8–7.6)
RBC # BLD AUTO: 3.46 X10(6)/MCL (ref 4.2–5.4)
SODIUM SERPL-SCNC: 142 MMOL/L (ref 136–145)
WBC # SPEC AUTO: 5.6 X10(3)/MCL (ref 4.5–11.5)

## 2021-11-04 ENCOUNTER — HISTORICAL (OUTPATIENT)
Dept: INFUSION THERAPY | Facility: HOSPITAL | Age: 71
End: 2021-11-04

## 2022-04-13 ENCOUNTER — HISTORICAL (OUTPATIENT)
Dept: RADIOLOGY | Facility: HOSPITAL | Age: 72
End: 2022-04-13
Payer: MEDICARE

## 2022-04-13 ENCOUNTER — HISTORICAL (OUTPATIENT)
Dept: ADMINISTRATIVE | Facility: HOSPITAL | Age: 72
End: 2022-04-13

## 2022-04-14 ENCOUNTER — HISTORICAL (OUTPATIENT)
Dept: HEMATOLOGY/ONCOLOGY | Facility: CLINIC | Age: 72
End: 2022-04-14
Payer: MEDICARE

## 2022-04-14 LAB
ABS NEUT (OLG): 4.57 (ref 2.1–9.2)
ALBUMIN SERPL-MCNC: 3.8 G/DL (ref 3.4–4.8)
ALBUMIN/GLOB SERPL: 1.2 {RATIO} (ref 1.1–2)
ALP SERPL-CCNC: 87 U/L (ref 40–150)
ALT SERPL-CCNC: 17 U/L (ref 0–55)
AST SERPL-CCNC: 16 U/L (ref 5–34)
BASOPHILS # BLD AUTO: 0 10*3/UL (ref 0–0.2)
BASOPHILS NFR BLD AUTO: 0.4 %
BILIRUB SERPL-MCNC: 0.3 MG/DL
BILIRUBIN DIRECT+TOT PNL SERPL-MCNC: 0.1 (ref 0–0.5)
BILIRUBIN DIRECT+TOT PNL SERPL-MCNC: 0.2 (ref 0–0.8)
BUN SERPL-MCNC: 32.6 MG/DL (ref 9.8–20.1)
CALCIUM SERPL-MCNC: 9.8 MG/DL (ref 8.7–10.5)
CHLORIDE SERPL-SCNC: 104 MMOL/L (ref 98–107)
CO2 SERPL-SCNC: 25 MMOL/L (ref 23–31)
CREAT SERPL-MCNC: 1.24 MG/DL (ref 0.55–1.02)
EOSINOPHIL # BLD AUTO: 0.1 10*3/UL (ref 0–0.9)
EOSINOPHIL NFR BLD AUTO: 2 %
ERYTHROCYTE [DISTWIDTH] IN BLOOD BY AUTOMATED COUNT: 12.2 % (ref 11.5–17)
GLOBULIN SER-MCNC: 3.3 G/DL (ref 2.4–3.5)
GLUCOSE SERPL-MCNC: 164 MG/DL (ref 82–115)
HCT VFR BLD AUTO: 33.9 % (ref 37–47)
HEMOLYSIS INTERF INDEX SERPL-ACNC: 3
HGB BLD-MCNC: 11.1 G/DL (ref 12–16)
ICTERIC INTERF INDEX SERPL-ACNC: 0
LIPEMIC INTERF INDEX SERPL-ACNC: 15
LYMPHOCYTES # BLD AUTO: 1.7 10*3/UL (ref 0.6–4.6)
LYMPHOCYTES NFR BLD AUTO: 24.7 %
MANUAL DIFF? (OHS): NO
MCH RBC QN AUTO: 31.3 PG (ref 27–31)
MCHC RBC AUTO-ENTMCNC: 32.7 G/DL (ref 33–36)
MCV RBC AUTO: 95.5 FL (ref 80–94)
MONOCYTES # BLD AUTO: 0.5 10*3/UL (ref 0.1–1.3)
MONOCYTES NFR BLD AUTO: 6.9 %
NEUTROPHILS # BLD AUTO: 4.6 10*3/UL (ref 2.1–9.2)
NEUTROPHILS NFR BLD AUTO: 65.9 %
PLATELET # BLD AUTO: 261 10*3/UL (ref 130–400)
PMV BLD AUTO: 9.5 FL (ref 9.4–12.4)
POTASSIUM SERPL-SCNC: 4.5 MMOL/L (ref 3.5–5.1)
PROT SERPL-MCNC: 7.1 G/DL (ref 5.8–7.6)
RBC # BLD AUTO: 3.55 10*6/UL (ref 4.2–5.4)
SODIUM SERPL-SCNC: 139 MMOL/L (ref 136–145)
WBC # SPEC AUTO: 7 10*3/UL (ref 4.5–11.5)

## 2022-04-29 DIAGNOSIS — M81.0 OSTEOPOROSIS, UNSPECIFIED OSTEOPOROSIS TYPE, UNSPECIFIED PATHOLOGICAL FRACTURE PRESENCE: ICD-10-CM

## 2022-04-30 NOTE — PROGRESS NOTES
Patient:   Erma Law            MRN: [id number]            FIN: [id number]               Age:   66 years     Sex:  Female     :  1950   Associated Diagnoses:   Malignant neoplasm of upper-outer quadrant of left female breast; Ductal carcinoma in situ (DCIS) of left breast; Atypical ductal hyperplasia of left breast; Estrogen receptor positive; Progesterone receptor positive neoplasm; HER2-negative carcinoma of breast; Status post left breast lumpectomy   Author:   Iqra Nathan MD      Visit Information   HEMATOLOGY/ONCOLOGY VISIT    Initial Consultation:5/15/17  Referring Physician:Dr Sam Lawson  Other Physicians:  Code Status:Not addressed     Diagnosis/Problem list:   T1bN0M0-Stage I left UOQ breast cancer (Dx ), ER 97%, TN 90%. HER-2/jenny negative, DCIS component. 0/1SLN+    Present Treatment:  Femara    Treatment history:    Left lumpectomy (17)    Plan of care: post lumpectomy XRT --> Femara x at least 5 yrs    Clinical History: Ms Law is a pleasant lady kindly referred by dr Lawson, diagnosed with breast cancer when she was evaluated for an abnormal mammogram. On 17 patient underwent routine screening mammogram that was read first BI-RADS Category 2: Benign but Upon further review, asymmetry within the upper outer left breast posteriorly most conspicuous on the exaggerated CC lateral and no prior exaggerated CC lateral available for comparison to determine stability. It was changed to Cat 0: Further evaluation with spot compression views recommended. On 3/20/17 further views showed Suspicious 9 mm spiculated mass in the left upper outer quadrant. Nodular densities in the 6-7 o'clock left breast and left upper outer quadrant. BIRADS Category 0.  Incomplete.  RECOMMENDATION:  Needs further imaging evaluation.  Targeted left breast ultrasound is recommended for further evaluation. Given the appearance of the spiculated mass in the left breast, biopsy will  likely be necessary.       On 4/3/17: left breast u/s showed an 8mm irregular hypoechoic lesion in the 2:00 left breast, correlating with the spiculated mass seen in the left upper outer quadrant on recent diagnostic mammogram. BIRADS Category 4: Suspicious. RECOMMENDATION: Ultrasound guided core needle biopsy of the suspicious mass in the 2:00 left breast is recommended. It was performed same day by Dr Eros Blank. pathology report LEFT BREAST LESION, UPPER OUTER QUADRANT: INVASIVE DUCTAL CARCINOMA, BLOOM NOGUEIRA GRADE 1. DUCTAL CARCINOMA IN SITU, LOW GRADE SOLID TYPE.  DCIS is present on all four biopsies, the largest focus measuring 0.7 cm.  Invasive carcinoma is present on three of the core biopsies, the largest focus measuring 0.5 cm.    She underwent left breast ultrasound-guided lumpectomy with left axillary sentinel node biopsy on 4/26/17 performed by Dr. Sam Lawson. Pathology showed INVASIVE DUCTAL CARCINOMA, GRADE I (MODIFIED BLOOM-NOGUEIRA).  Greatest tumor dimension, 0.8 cm. Lymphovascular invasion, not identified.  Perineural invasion, not identified. Surgical margins, uninvolved. DUCTAL CARCINOMA IN SITU, CRIBRIFORM AND SOLID, NUCLEAR GRADE 1.  Greatest dimension of DCIS, 0.2 cm.  SENTINEL LYMPH NODE, LYMPHADENECTOMY: ONE REACTIVE LYMPH NODE.  NO EVIDENCE OF NEOPLASM (0/1). FOCAL ATYPICAL DUCTAL HYPERPLASIA.     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 She have a trip coming soon to [place]. She is leaving early June.    Patient is feeling well today and does not have any complaints at all. She denies any fever, chills, sweats. No chest pain or shortness of breath. No changes in bowel habits. No bleeding.      Chief Complaint   6/26/2017 9:33 CDT       none        Interval History   Patient is here today in follow-up and is doing good. She does not have any complaints at all. She is scheduled to have neck surgery tomorrow in [place]. The surgery was scheduled even  prior to her diagnosis of breast cancer. She has not been seen by the radiation oncology yet. She just came back from her [place] trip. She reports that she have a great time. She started Femara and overall she is doing great with the medication. She does not have any of the side effects that she read about. No fever, chills, sweats. No chest pain or shortness of breath. No changes in self breast exam. She is requesting to place the radiation oncology consult after she recuperats from her neck surgery.      Review of Systems   All 12 points of the review of systems were obtained and pertinent as per above history      Health Status   Allergies:    Allergic Reactions (All)  Severity Not Documented  Codeine- N & v.,    Allergies (1) Active Reaction  codeine N & V     Current medications:  (Selected)   Outpatient Medications  Pending Complete  flurbiprofen ophthalmic: 1 drop(s), form: Soln-Opth, Eye-Left, q30min, Order duration: 4 dose(s), first dose 01/22/14 12:00:00 CST, stop date 01/22/14 13:59:00 CST, on arrival and every 30 minutes x 4 doses  Prescriptions  Prescribed  Femara 2.5 mg oral tablet: 2.5 mg = 1 tab(s), Oral, Daily, # 30 tab(s), 11 Refill(s), Pharmacy: [facility], [facility].  Documented Medications  Documented  Farxiga 10 mg oral tablet: 10 mg = 1 tab(s), Oral, Daily, # 30 tab(s), 0 Refill(s)  Melatonin 3 mg oral tablet: 3 mg = 1 tab(s), Oral, Once a day (at bedtime), PRN PRN for insomnia, # 60 tab(s), 0 Refill(s)  Norvasc 5 mg oral tablet: 5 mg = 1 tab(s), Oral, Daily, # 30 tab(s), 0 Refill(s)  Probiotic Formula oral capsule: 0 Refill(s)  Tanzeum 50 mg subcutaneous injection: 50 mg, Subcutaneous, q4wk, # 4 EA, 0 Refill(s)  Tresiba FlexTouch 200 units/mL subcutaneous solution: 10 units, Subcutaneous, At Bedtime, 0 Refill(s)  Vitamin D3 1000 intl units oral capsule: 1,000 IntUnit = 1 cap(s), Oral, Daily, 0 Refill(s)  aspirin 81 mg oral Delayed Release (EC) tablet: 81 mg = 1 tab(s), Oral,  Daily, # 30 tab(s), 0 Refill(s)  biotin 2.5 mg oral tablet: 7.5 mg = 3 tab(s), Oral, Daily, # 30 tab(s), 0 Refill(s)  cranberry oral capsule: 15,000 mg =, Oral, Once a day (at bedtime), 0 Refill(s)  docusate sodium 10 mg/mL oral liquid: 100 mg = 10 mL, Oral, Daily, 1 OR 2, # 900 mL, 0 Refill(s)  enalapril 20 mg oral tablet: BID, 0 Refill(s)  glipiZIDE 10 mg oral tablet: BID, 0 Refill(s)  hydrochlorothiazide: 25 mg, Oral, Daily, 0 Refill(s)  levothyroxine 112 mcg (0.112 mg) oral tablet: Oral, Daily, 0 Refill(s)  metformin: 1,000 mg, Oral, BID, 0 Refill(s)  multivitamin with minerals (Adult Tab): Oral, Daily, 0 Refill(s)  omega-3 polyunsaturated fatty acids (Fish Oil 1000mg Cap): Oral, Daily, 0 Refill(s)  simvastatin 40 mg oral tablet: 40 mg = 1 tab(s), Oral, Once a day (at bedtime), # 90 tab(s), 0 Refill(s),   Reviewed   Problem list:    All Problems  Arthritis / SNOMED CT 2894438 / Confirmed  Breast cancer / SNOMED CT 112615438 / Confirmed  Cataracts / SNOMED CT 3928837636 / Confirmed  bilateral  Diabetes mellitus / SNOMED CT 830944846 / Confirmed  chronic  anemia / SNOMED CT 877926000 / Confirmed  Heart murmur / SNOMED CT 1900295870 / Confirmed  functional heart murmur discovered 40 years ago, no cardiologist  Hypercholesterolemia / SNOMED CT 45102322 / Confirmed  Hypertension / SNOMED CT 61962007 / Confirmed  Resolved: sinus allergies / SNOMED CT 005178244  Resolved: Cat scratch fever / SNOMED CT 586704262  1988  Resolved: Diverticulosis / SNOMED CT 37018672  Resolved: H/O: bronchitis / SNOMED CT 187736499  1/2013?  Resolved: h/o ear/throat infections / SNOMED CT 0401116372  Resolved: Migraines / SNOMED CT [id number]  none is last year  Resolved: Stress bladder incontinence / SNOMED CT 019455741  Resolved: Thyroid disease / SNOMED CT 255058844  Resolved: UTI - Urinary tract infection / SNOMED CT 0793939717  in past  Canceled: can lie flat / SNOMED CT 891204059  Canceled: sinus infection  1/2013  Canceled: steroid injectin within last 2 years  Canceled: Wears glasses / SNOMED CT 310544429,    Active Problems (8)  Arthritis   Breast cancer   Cataracts   chronic  anemia   Diabetes mellitus   Heart murmur   Hypercholesterolemia   Hypertension         Histories   Past Medical History:    Active  Cataracts (2332219515)  Comments:  4/15/2013 CDT 10:28 Traci Santiago RN  bilateral  Hypercholesterolemia (05968169)  Hypertension (16729906)  Heart murmur (4424897758)  Comments:  4/15/2013 CDT 10:29 Traci Santiago RN  functional heart murmur discovered 40 years ago, no cardiologist  Arthritis (2413769)  Diabetes mellitus (076182966)  chronic  anemia (333250443)  Resolved  sinus allergies (655692592):  Resolved.  h/o ear/throat infections (2208664951):  Resolved.  H/O: bronchitis (601692728):  Resolved.  Comments:  4/17/2013 CDT 12:36 Rufina Cuellar RN  1/2013?  Diverticulosis (69442718):  Resolved.  UTI - Urinary tract infection (7077863472):  Resolved.  Comments:  4/15/2013 CDT 10:30 Traci Santiago RN  in past  Stress bladder incontinence (409029121):  Resolved.  Migraines ([id number]):  Resolved.  Comments:  4/15/2013 CDT 10:31 Traci Santiago RN  none is last year  Thyroid disease (369995985):  Resolved.  Cat scratch fever (685321475):  Resolved.  Comments:  4/15/2013 CDT 10:33 Traci Santiago RN  1988   Family History:    Primary malignant neoplasm of breast  Other (maternal aunt)     Procedure history:    Biopsy Sentinal Node (Left) on 4/26/2017 at 66 Years.  Comments:  4/26/2017 10:Amelia Pruitt RN  auto-populated from documented surgical case  Lumpectomy Breast (Left) on 4/26/2017 at 66 Years.  Comments:  4/26/2017 10:Amelia Pruitt RN  auto-populated from documented surgical case  Cataract Extract Phacoemulsification/IOL (Left) on 1/22/2014 at 63 Years.  Comments:  1/22/2014 13:33 - Ronaldo CEJA, Ramy  L  auto-populated from documented surgical case  Tubal ligation (502877873).  Tonsillectomy (307520336).  cervical lymph node removal.  Colonoscopy (101843384).  [facility] - Extracapsular cataract extraction (2578298337).  Comments:  2014 11:45 - Judy Peace RN  2013, right eye      Gynecologic history   Menstrual cycle: approximate menarche at 15 years of age, LMP , menopause at 42 .   Pregnancy status:  2, para 2.   Social History        Social & Psychosocial Habits    Alcohol  04/15/2013  Use: Current    Type: Wine    Comment: occasionally - 04/15/2013 10:26 - Traci Winchester RN    Employment/School  04/15/2013  Status: Retired    Highest education: University degree(s)    Substance Abuse  04/15/2013 Risk Assessment: Denies Substance Abuse    Tobacco  04/15/2013 Risk Assessment: Denies Tobacco Use    04/10/2017  Use: Never smoker  .        Physical Examination   Vital Signs   2017 9:33 CDT       Temperature Oral          36.5 DegC                             Peripheral Pulse Rate     85 bpm                             Systolic Blood Pressure   120 mmHg                             Diastolic Blood Pressure  66 mmHg     Measurements from flowsheet : Measurements   2017 9:33 CDT       Weight Dosing             85.0 kg                             Weight Measured           85.0 kg                             Weight Measured and Calculated in Lbs     187.39 lb                             Height/Length Dosing      165.1 cm                             Height/Length Measured    165.1 cm                             BSA Measured              1.97 m2                             Body Mass Index Measured  31.18 kg/m2     General:  Alert and oriented, No acute distress.         Appearance: Well nourished, Well developed.    Eye:  Pupils are equal, round and reactive to light, Extraocular movements are intact, Normal conjunctiva.         Sclera: Not icteric.    HENT:  Normocephalic, Oral mucosa  is moist, No pharyngeal erythema, No sinus tenderness.    Neck:  Supple, No jugular venous distention, No lymphadenopathy.    Respiratory:  Lungs are clear to auscultation, Respirations are non-labored.    Cardiovascular:  Normal rate, Regular rhythm, No murmur, No gallop, No edema.    Breast:  No mass, No tenderness, No discharge.         Surgically altered: Left breast.         Scar(s): Left, Lumpectomy, well healed.    Gastrointestinal:  Soft, Non-tender, Non-distended, Normal bowel sounds, No organomegaly.    Genitourinary:  No costovertebral angle tenderness, No inguinal tenderness.    Lymphatics:  No lymphadenopathy neck, axilla, groin.    Musculoskeletal:  Normal range of motion, Normal gait.    Integumentary:  Warm, Dry.    Neurologic:  Alert, Oriented, No focal deficits, Cranial Nerves II-XII are grossly intact.    Cognition and Speech:  Oriented, Speech clear and coherent, Functional cognition intact.    Psychiatric:  Cooperative, Appropriate mood & affect, Normal judgment.    ECOG Performance Scale: 0 - Fully active; no performance restrictions.      Review / Management   Radiology Reports:  As above    Pathology Reports:  4/26/17: [1]  LEFT BREAST, LUMPECTOMY:   A)  INVASIVE DUCTAL CARCINOMA, GRADE I (MODIFIED BLOOM-NOGUEIRA).  --  Greatest tumor dimension, 0.8 cm.  --  Tubule score:  2  --  Nuclear score: 1  --  Mitotic score:1 (2 mitoses / 10 HPFs).   --  Total Amston score:  4/9 (Grade III).   --  Lymphovascular invasion, not identified.  --  Perineural invasion, not identified.  --  Surgical margins, uninvolved.   --  Closest margin, deep, within 2.7 mm from the invasive carcinoma.   --  Distance of invasive carcinoma from the lateral margin, 8 mm.   --  Distance of invasive carcinoma from the medial margin, 12.5 mm.     NOTE: The deep and encompassing margin was reexcised (specimen #3).  B)  DUCTAL CARCINOMA IN SITU, CRIBRIFORM AND SOLID, NUCLEAR GRADE 1.  --  Greatest dimension of DCIS, 0.2  cm.  --  Surgical margins, uninvolved.   --  Closest margin, medial, 3 mm from the DCIS.    NOTE: This focus is at least 1 cm away from the site of invasive carcinoma.    [2]  SENTINEL LYMPH NODE, LYMPHADENECTOMY:   ONE REACTIVE LYMPH NODE.  NO EVIDENCE OF NEOPLASM (0/1).  [3]  LEFT BREAST, RE-EXCISION OF THE DEEP AND ENCOMPASSING MARGIN:  A)  NO EVIDENCE OF INVASIVE CARCINOMA OR DCIS.   B)  FOCAL ATYPICAL DUCTAL HYPERPLASIA.   C)  FIBROCYSTIC CHANGE.     STAGE (pTNM) (Note M):           TNM Descriptors:  Not applicable         Primary Tumor (Invasive Carcinoma) (pT):  PT1b:  Tumor > 5 mm but <= 10 mm in greatest dimension         Regional Lymph Nodes (pN) (choose a category based on lymph nodes received with the specimen;  immunohistochemistry and / or molecular studies are not required):                 Category (pN):  PN0: No regional lymph node metastasis identified histologically         Distant Metastasis (pM):  Not applicable   ADDITIONAL NON-TUMOR:  Additional Pathologic Findings:  Atypical ductal hyperplasia, Fibrocystic change.       4/3/17: LEFT BREAST LESION, UPPER OUTER QUADRANT: INVASIVE DUCTAL CARCINOMA, BLOOM NOGUEIRA GRADE 1.  DUCTAL CARCINOMA IN SITU, LOW GRADE SOLID TYPE.   COMMENT:  DCIS is present on all four biopsies, the largest focus measuring 0.7 cm.  Invasive carcinoma is present on three of the core biopsies, the largest focus measuring 0.5 cm. The results of ER, AK and Her2 jenny analysis will be the subject of an addendum report.  This case was reviewed in intradepartmental consultation.       Breast Tumor Prognostic Profile    TEST DESCRIPTION RESULT  INTERPRETATION     Estrogen Receptor    97.36 %   Positive     Progesterone Receptor    90.13 %   Positive     Urr2tje/c-erb-2 oncoprotein   1+   Negative         Results review      Impression and Plan   Diagnosis     Malignant neoplasm of upper-outer quadrant of left female breast (HGO36-RO C50.412).     Ductal carcinoma in situ (DCIS)  of left breast (EDW50-FI D05.12).     Atypical ductal hyperplasia of left breast (JLD63-KA N60.92).     Estrogen receptor positive (JDP27-QR Z17.0).     Progesterone receptor positive neoplasm (BFA28-KZ C80.1).     HER2-negative carcinoma of breast (DSH85-WN C50.919).     Status post left breast lumpectomy (XMG61-CX Z90.12).       IMPRESSION:    Left breast infiltrating ductal carcinoma arising from  DCIS  Estrogen receptor/progesterone receptor positive  HER-2/jenny negative  Status post left breast lumpectomy  DM  HTN  Hypothyroidism    PLAN:    Patient will need postlumpectomy radiation. Patient is back from her trip to [place]. She will have neck surgery tomorrow, therefore, she is requesting to start radiation after she has recuperated from her surgery. I discussed with the patient that I will defer radiation/oncology referral for now and I will refer back once she has cleared by her neurosurgeon. I will see her in 6 weeks or earlier if needed. I explained to her that when she sees her surgeon  2 weeks after of her procedure and he clear her, to let me know So I can place the referral right away instead of waiting to 6 weeks. She understood and all her questions were answered. She is aware that is very important for her to have post lumpectomy radiation. She also know that she will get benefit from postlumpectomy radiation but I'm not sure if she can get the maximum benefit due to the delay. Hopefully with the Femara it will be as effective. She understood all this but because of everything was planned before she was diagnosed with breast cancer she couldn't change any of those.     RTC 6 weeks or earlier if needed with labs   Cont Mikala   Refer to Dr Dash for post lumpectomy XRT after cleared by her Neurosurgeon    All of the above discussed with the patient and family that was given ample opportunity to ask questions and they were all answered to satisfaction; patient  and family demonstrated  understanding of what we discussed and is agreeable to the plan.       FILIPPO OLIVA MD

## 2022-04-30 NOTE — PROGRESS NOTES
Patient:   Erma Law            MRN: [id number]            FIN: [id number]               Age:   69 years     Sex:  Female     :  1950   Associated Diagnoses:   None   Author:   Jac BENNETT, Filippo ADHIKARI      Diagnostic mammogram 2020  IMPRESSION: SUSPICIOUS OF MALIGNANCY  Oval mass in the 2:00 left breast is suspicious. Left ultrasound-guided biopsy is recommended.  Mammography Abnormal  OVERALL STUDY BIRADS: 4 Suspicious for malignancy    On 3/3/2020: Ultrasound guided biopsy of the 0.5 cm mass in the left breast at 2 o'clock 5 cm from the nipple   Path: ORGANIZING FAT NECROSIS WITH FOCAL CALCIFICATION.  NO EVIDENCE OF MALIGNANCY.    FILIPPO OLIVA MD

## 2022-05-04 ENCOUNTER — INFUSION (OUTPATIENT)
Dept: INFUSION THERAPY | Facility: HOSPITAL | Age: 72
End: 2022-05-04
Attending: INTERNAL MEDICINE
Payer: MEDICARE

## 2022-05-04 VITALS
OXYGEN SATURATION: 99 % | DIASTOLIC BLOOD PRESSURE: 83 MMHG | HEIGHT: 65 IN | HEART RATE: 78 BPM | TEMPERATURE: 99 F | BODY MASS INDEX: 36.65 KG/M2 | WEIGHT: 220 LBS | SYSTOLIC BLOOD PRESSURE: 146 MMHG | RESPIRATION RATE: 16 BRPM

## 2022-05-04 DIAGNOSIS — M81.0 OSTEOPOROSIS, UNSPECIFIED OSTEOPOROSIS TYPE, UNSPECIFIED PATHOLOGICAL FRACTURE PRESENCE: Primary | ICD-10-CM

## 2022-05-04 PROCEDURE — 96372 THER/PROPH/DIAG INJ SC/IM: CPT

## 2022-05-04 PROCEDURE — 63600175 PHARM REV CODE 636 W HCPCS: Mod: JG | Performed by: INTERNAL MEDICINE

## 2022-05-04 RX ADMIN — DENOSUMAB 60 MG: 60 INJECTION SUBCUTANEOUS at 02:05

## 2022-05-31 ENCOUNTER — DOCUMENTATION ONLY (OUTPATIENT)
Dept: HEMATOLOGY/ONCOLOGY | Facility: CLINIC | Age: 72
End: 2022-05-31
Payer: MEDICARE

## 2022-09-23 DIAGNOSIS — N18.30 STAGE 3 CHRONIC KIDNEY DISEASE: ICD-10-CM

## 2022-09-23 DIAGNOSIS — E78.2 MIXED HYPERLIPIDEMIA: ICD-10-CM

## 2022-09-23 DIAGNOSIS — E11.22 TYPE 2 DIABETES MELLITUS WITH DIABETIC CHRONIC KIDNEY DISEASE: ICD-10-CM

## 2022-09-23 DIAGNOSIS — D50.8 OTHER IRON DEFICIENCY ANEMIAS: ICD-10-CM

## 2022-09-23 DIAGNOSIS — I10 HYPERTENSION: Primary | ICD-10-CM

## 2022-09-29 ENCOUNTER — HOSPITAL ENCOUNTER (OUTPATIENT)
Dept: RADIOLOGY | Facility: HOSPITAL | Age: 72
Discharge: HOME | End: 2022-09-29
Attending: INTERNAL MEDICINE
Payer: MEDICARE

## 2022-09-29 DIAGNOSIS — N18.30 STAGE 3 CHRONIC KIDNEY DISEASE: ICD-10-CM

## 2022-09-29 DIAGNOSIS — E78.2 MIXED HYPERLIPIDEMIA: ICD-10-CM

## 2022-09-29 DIAGNOSIS — E11.22 TYPE 2 DIABETES MELLITUS WITH DIABETIC CHRONIC KIDNEY DISEASE: ICD-10-CM

## 2022-09-29 DIAGNOSIS — D50.8 OTHER IRON DEFICIENCY ANEMIAS: ICD-10-CM

## 2022-09-29 DIAGNOSIS — I10 HYPERTENSION: ICD-10-CM

## 2022-09-29 PROCEDURE — 76770 US EXAM ABDO BACK WALL COMP: CPT | Mod: TC

## 2022-09-30 ENCOUNTER — LAB VISIT (OUTPATIENT)
Dept: LAB | Facility: HOSPITAL | Age: 72
End: 2022-09-30
Payer: MEDICARE

## 2022-09-30 DIAGNOSIS — N18.30 CHRONIC KIDNEY DISEASE, STAGE III (MODERATE): ICD-10-CM

## 2022-09-30 DIAGNOSIS — E11.22 TYPE 2 DIABETES MELLITUS WITH END-STAGE RENAL DISEASE: ICD-10-CM

## 2022-09-30 DIAGNOSIS — N18.6 TYPE 2 DIABETES MELLITUS WITH END-STAGE RENAL DISEASE: ICD-10-CM

## 2022-09-30 DIAGNOSIS — I10 ESSENTIAL HYPERTENSION, MALIGNANT: Primary | ICD-10-CM

## 2022-09-30 DIAGNOSIS — E78.2 MIXED HYPERLIPIDEMIA: ICD-10-CM

## 2022-09-30 LAB
ALBUMIN SERPL-MCNC: 3.6 GM/DL (ref 3.4–4.8)
ALBUMIN/GLOB SERPL: 1 RATIO (ref 1.1–2)
ALP SERPL-CCNC: 76 UNIT/L (ref 40–150)
ALT SERPL-CCNC: 12 UNIT/L (ref 0–55)
AST SERPL-CCNC: 15 UNIT/L (ref 5–34)
BILIRUBIN DIRECT+TOT PNL SERPL-MCNC: 0.4 MG/DL
BUN SERPL-MCNC: 49.2 MG/DL (ref 9.8–20.1)
CALCIUM SERPL-MCNC: 9.8 MG/DL (ref 8.4–10.2)
CHLORIDE SERPL-SCNC: 104 MMOL/L (ref 98–107)
CO2 SERPL-SCNC: 23 MMOL/L (ref 23–31)
CREAT SERPL-MCNC: 1.39 MG/DL (ref 0.55–1.02)
CREAT UR-MCNC: 61 MG/DL (ref 47–110)
FERRITIN SERPL-MCNC: 148.3 NG/ML (ref 4.63–204)
GFR SERPLBLD CREATININE-BSD FMLA CKD-EPI: 41 MLS/MIN/1.73/M2
GLOBULIN SER-MCNC: 3.6 GM/DL (ref 2.4–3.5)
GLUCOSE SERPL-MCNC: 153 MG/DL (ref 82–115)
IRON SATN MFR SERPL: 26 % (ref 20–50)
IRON SERPL-MCNC: 67 UG/DL (ref 50–170)
POTASSIUM SERPL-SCNC: 4.5 MMOL/L (ref 3.5–5.1)
PROT SERPL-MCNC: 7.2 GM/DL (ref 5.8–7.6)
PROT UR STRIP-MCNC: 8.8 MG/DL
PROT/CREAT UR-RTO: 144.3 MG/GM CR
SODIUM SERPL-SCNC: 138 MMOL/L (ref 136–145)
TIBC SERPL-MCNC: 195 UG/DL (ref 70–310)
TIBC SERPL-MCNC: 262 UG/DL (ref 250–450)
TRANSFERRIN SERPL-MCNC: 226 MG/DL (ref 173–360)
URATE SERPL-MCNC: 7.9 MG/DL (ref 2.6–6)

## 2022-09-30 PROCEDURE — 36415 COLL VENOUS BLD VENIPUNCTURE: CPT

## 2022-09-30 PROCEDURE — 84550 ASSAY OF BLOOD/URIC ACID: CPT

## 2022-09-30 PROCEDURE — 82570 ASSAY OF URINE CREATININE: CPT

## 2022-09-30 PROCEDURE — 83540 ASSAY OF IRON: CPT

## 2022-09-30 PROCEDURE — 80053 COMPREHEN METABOLIC PANEL: CPT

## 2022-09-30 PROCEDURE — 82728 ASSAY OF FERRITIN: CPT

## 2022-10-17 DIAGNOSIS — C50.412 MALIGNANT NEOPLASM OF UPPER-OUTER QUADRANT OF LEFT BREAST IN FEMALE, ESTROGEN RECEPTOR POSITIVE: Primary | ICD-10-CM

## 2022-10-17 DIAGNOSIS — Z17.0 MALIGNANT NEOPLASM OF UPPER-OUTER QUADRANT OF LEFT BREAST IN FEMALE, ESTROGEN RECEPTOR POSITIVE: Primary | ICD-10-CM

## 2022-10-18 ENCOUNTER — LAB VISIT (OUTPATIENT)
Dept: LAB | Facility: HOSPITAL | Age: 72
End: 2022-10-18
Attending: INTERNAL MEDICINE
Payer: MEDICARE

## 2022-10-18 DIAGNOSIS — C50.412 MALIGNANT NEOPLASM OF UPPER-OUTER QUADRANT OF LEFT BREAST IN FEMALE, ESTROGEN RECEPTOR POSITIVE: ICD-10-CM

## 2022-10-18 DIAGNOSIS — Z17.0 MALIGNANT NEOPLASM OF UPPER-OUTER QUADRANT OF LEFT BREAST IN FEMALE, ESTROGEN RECEPTOR POSITIVE: ICD-10-CM

## 2022-10-18 LAB
ALBUMIN SERPL-MCNC: 3.9 GM/DL (ref 3.4–4.8)
ALBUMIN/GLOB SERPL: 1.3 RATIO (ref 1.1–2)
ALP SERPL-CCNC: 83 UNIT/L (ref 40–150)
ALT SERPL-CCNC: 12 UNIT/L (ref 0–55)
AST SERPL-CCNC: 14 UNIT/L (ref 5–34)
BASOPHILS # BLD AUTO: 0.04 X10(3)/MCL (ref 0–0.2)
BASOPHILS NFR BLD AUTO: 0.5 %
BILIRUBIN DIRECT+TOT PNL SERPL-MCNC: 0.3 MG/DL
BUN SERPL-MCNC: 49 MG/DL (ref 9.8–20.1)
CALCIUM SERPL-MCNC: 9.6 MG/DL (ref 8.4–10.2)
CHLORIDE SERPL-SCNC: 105 MMOL/L (ref 98–107)
CO2 SERPL-SCNC: 24 MMOL/L (ref 23–31)
CREAT SERPL-MCNC: 1.96 MG/DL (ref 0.55–1.02)
EOSINOPHIL # BLD AUTO: 0.18 X10(3)/MCL (ref 0–0.9)
EOSINOPHIL NFR BLD AUTO: 2.3 %
ERYTHROCYTE [DISTWIDTH] IN BLOOD BY AUTOMATED COUNT: 12.2 % (ref 11.5–17)
GFR SERPLBLD CREATININE-BSD FMLA CKD-EPI: 27 MLS/MIN/1.73/M2
GLOBULIN SER-MCNC: 3.1 GM/DL (ref 2.4–3.5)
GLUCOSE SERPL-MCNC: 206 MG/DL (ref 82–115)
HCT VFR BLD AUTO: 33.6 % (ref 37–47)
HGB BLD-MCNC: 10.6 GM/DL (ref 12–16)
IMM GRANULOCYTES # BLD AUTO: 0.01 X10(3)/MCL (ref 0–0.04)
IMM GRANULOCYTES NFR BLD AUTO: 0.1 %
LYMPHOCYTES # BLD AUTO: 1.73 X10(3)/MCL (ref 0.6–4.6)
LYMPHOCYTES NFR BLD AUTO: 21.7 %
MCH RBC QN AUTO: 30.5 PG (ref 27–31)
MCHC RBC AUTO-ENTMCNC: 31.5 MG/DL (ref 33–36)
MCV RBC AUTO: 96.6 FL (ref 80–94)
MONOCYTES # BLD AUTO: 0.56 X10(3)/MCL (ref 0.1–1.3)
MONOCYTES NFR BLD AUTO: 7 %
NEUTROPHILS # BLD AUTO: 5.5 X10(3)/MCL (ref 2.1–9.2)
NEUTROPHILS NFR BLD AUTO: 68.4 %
PLATELET # BLD AUTO: 253 X10(3)/MCL (ref 130–400)
PMV BLD AUTO: 9.8 FL (ref 7.4–10.4)
POTASSIUM SERPL-SCNC: 4.9 MMOL/L (ref 3.5–5.1)
PROT SERPL-MCNC: 7 GM/DL (ref 5.8–7.6)
RBC # BLD AUTO: 3.48 X10(6)/MCL (ref 4.2–5.4)
SODIUM SERPL-SCNC: 138 MMOL/L (ref 136–145)
WBC # SPEC AUTO: 8 X10(3)/MCL (ref 4.5–11.5)

## 2022-10-18 PROCEDURE — 36415 COLL VENOUS BLD VENIPUNCTURE: CPT

## 2022-10-18 PROCEDURE — 86300 IMMUNOASSAY TUMOR CA 15-3: CPT

## 2022-10-18 PROCEDURE — 85025 COMPLETE CBC W/AUTO DIFF WBC: CPT

## 2022-10-18 PROCEDURE — 80053 COMPREHEN METABOLIC PANEL: CPT

## 2022-10-20 LAB — CANCER AG27-29 SERPL-ACNC: 12.8 U/ML

## 2022-10-25 ENCOUNTER — OFFICE VISIT (OUTPATIENT)
Dept: HEMATOLOGY/ONCOLOGY | Facility: CLINIC | Age: 72
End: 2022-10-25
Payer: MEDICARE

## 2022-10-25 VITALS
HEART RATE: 83 BPM | SYSTOLIC BLOOD PRESSURE: 110 MMHG | WEIGHT: 226 LBS | OXYGEN SATURATION: 99 % | TEMPERATURE: 98 F | DIASTOLIC BLOOD PRESSURE: 70 MMHG | BODY MASS INDEX: 37.65 KG/M2 | HEIGHT: 65 IN

## 2022-10-25 DIAGNOSIS — M81.0 OSTEOPOROSIS, UNSPECIFIED OSTEOPOROSIS TYPE, UNSPECIFIED PATHOLOGICAL FRACTURE PRESENCE: ICD-10-CM

## 2022-10-25 DIAGNOSIS — C50.412 MALIGNANT NEOPLASM OF UPPER-OUTER QUADRANT OF LEFT BREAST IN FEMALE, ESTROGEN RECEPTOR POSITIVE: Primary | ICD-10-CM

## 2022-10-25 DIAGNOSIS — Z17.0 MALIGNANT NEOPLASM OF UPPER-OUTER QUADRANT OF LEFT BREAST IN FEMALE, ESTROGEN RECEPTOR POSITIVE: Primary | ICD-10-CM

## 2022-10-25 DIAGNOSIS — Z13.9 ENCOUNTER FOR SCREENING: ICD-10-CM

## 2022-10-25 DIAGNOSIS — Z17.0 ESTROGEN RECEPTOR POSITIVE: ICD-10-CM

## 2022-10-25 DIAGNOSIS — Z12.31 ENCOUNTER FOR SCREENING MAMMOGRAM FOR MALIGNANT NEOPLASM OF BREAST: ICD-10-CM

## 2022-10-25 PROCEDURE — 3074F SYST BP LT 130 MM HG: CPT | Mod: CPTII,S$GLB,, | Performed by: NURSE PRACTITIONER

## 2022-10-25 PROCEDURE — 3078F DIAST BP <80 MM HG: CPT | Mod: CPTII,S$GLB,, | Performed by: NURSE PRACTITIONER

## 2022-10-25 PROCEDURE — 99999 PR PBB SHADOW E&M-EST. PATIENT-LVL IV: CPT | Mod: PBBFAC,,, | Performed by: NURSE PRACTITIONER

## 2022-10-25 PROCEDURE — 1101F PR PT FALLS ASSESS DOC 0-1 FALLS W/OUT INJ PAST YR: ICD-10-PCS | Mod: CPTII,S$GLB,, | Performed by: NURSE PRACTITIONER

## 2022-10-25 PROCEDURE — 4010F ACE/ARB THERAPY RXD/TAKEN: CPT | Mod: CPTII,S$GLB,, | Performed by: NURSE PRACTITIONER

## 2022-10-25 PROCEDURE — 1160F RVW MEDS BY RX/DR IN RCRD: CPT | Mod: CPTII,S$GLB,, | Performed by: NURSE PRACTITIONER

## 2022-10-25 PROCEDURE — 1160F PR REVIEW ALL MEDS BY PRESCRIBER/CLIN PHARMACIST DOCUMENTED: ICD-10-PCS | Mod: CPTII,S$GLB,, | Performed by: NURSE PRACTITIONER

## 2022-10-25 PROCEDURE — 3288F PR FALLS RISK ASSESSMENT DOCUMENTED: ICD-10-PCS | Mod: CPTII,S$GLB,, | Performed by: NURSE PRACTITIONER

## 2022-10-25 PROCEDURE — 3078F PR MOST RECENT DIASTOLIC BLOOD PRESSURE < 80 MM HG: ICD-10-PCS | Mod: CPTII,S$GLB,, | Performed by: NURSE PRACTITIONER

## 2022-10-25 PROCEDURE — 1126F AMNT PAIN NOTED NONE PRSNT: CPT | Mod: CPTII,S$GLB,, | Performed by: NURSE PRACTITIONER

## 2022-10-25 PROCEDURE — 3288F FALL RISK ASSESSMENT DOCD: CPT | Mod: CPTII,S$GLB,, | Performed by: NURSE PRACTITIONER

## 2022-10-25 PROCEDURE — 1159F MED LIST DOCD IN RCRD: CPT | Mod: CPTII,S$GLB,, | Performed by: NURSE PRACTITIONER

## 2022-10-25 PROCEDURE — 3074F PR MOST RECENT SYSTOLIC BLOOD PRESSURE < 130 MM HG: ICD-10-PCS | Mod: CPTII,S$GLB,, | Performed by: NURSE PRACTITIONER

## 2022-10-25 PROCEDURE — 1159F PR MEDICATION LIST DOCUMENTED IN MEDICAL RECORD: ICD-10-PCS | Mod: CPTII,S$GLB,, | Performed by: NURSE PRACTITIONER

## 2022-10-25 PROCEDURE — 99214 PR OFFICE/OUTPT VISIT, EST, LEVL IV, 30-39 MIN: ICD-10-PCS | Mod: S$GLB,,, | Performed by: NURSE PRACTITIONER

## 2022-10-25 PROCEDURE — 1101F PT FALLS ASSESS-DOCD LE1/YR: CPT | Mod: CPTII,S$GLB,, | Performed by: NURSE PRACTITIONER

## 2022-10-25 PROCEDURE — 99999 PR PBB SHADOW E&M-EST. PATIENT-LVL IV: ICD-10-PCS | Mod: PBBFAC,,, | Performed by: NURSE PRACTITIONER

## 2022-10-25 PROCEDURE — 1126F PR PAIN SEVERITY QUANTIFIED, NO PAIN PRESENT: ICD-10-PCS | Mod: CPTII,S$GLB,, | Performed by: NURSE PRACTITIONER

## 2022-10-25 PROCEDURE — 99214 OFFICE O/P EST MOD 30 MIN: CPT | Mod: S$GLB,,, | Performed by: NURSE PRACTITIONER

## 2022-10-25 PROCEDURE — 4010F PR ACE/ARB THEARPY RXD/TAKEN: ICD-10-PCS | Mod: CPTII,S$GLB,, | Performed by: NURSE PRACTITIONER

## 2022-10-25 RX ORDER — OLMESARTAN MEDOXOMIL 20 MG/1
20 TABLET ORAL DAILY
COMMUNITY
Start: 2022-10-03

## 2022-10-25 RX ORDER — MV/FA/DHA/EPA/FISH OIL/SAW/GNK 400MCG-200
COMBINATION PACKAGE (EA) ORAL
COMMUNITY

## 2022-10-25 RX ORDER — ASPIRIN 81 MG/1
81 TABLET ORAL
COMMUNITY

## 2022-10-25 RX ORDER — INSULIN DEGLUDEC 200 U/ML
INJECTION, SOLUTION SUBCUTANEOUS
COMMUNITY
Start: 2022-10-16

## 2022-10-25 RX ORDER — DULAGLUTIDE 1.5 MG/.5ML
INJECTION, SOLUTION SUBCUTANEOUS
COMMUNITY
Start: 2022-10-16

## 2022-10-25 RX ORDER — ROSUVASTATIN CALCIUM 20 MG/1
20 TABLET, COATED ORAL DAILY
COMMUNITY
Start: 2022-10-03

## 2022-10-25 RX ORDER — FUROSEMIDE 20 MG/1
20 TABLET ORAL
COMMUNITY
Start: 2022-09-26

## 2022-10-25 RX ORDER — AMLODIPINE BESYLATE 5 MG/1
5 TABLET ORAL DAILY
COMMUNITY
Start: 2022-10-10

## 2022-10-25 RX ORDER — INSULIN LISPRO 100 [IU]/ML
15 INJECTION, SOLUTION INTRAVENOUS; SUBCUTANEOUS
COMMUNITY
Start: 2022-10-24

## 2022-10-25 RX ORDER — LETROZOLE 2.5 MG/1
TABLET, FILM COATED ORAL
COMMUNITY
Start: 2021-05-11

## 2022-10-25 RX ORDER — DULAGLUTIDE 0.75 MG/.5ML
INJECTION, SOLUTION SUBCUTANEOUS
COMMUNITY
Start: 2022-06-20

## 2022-10-25 RX ORDER — LEVOTHYROXINE SODIUM 112 UG/1
112 TABLET ORAL DAILY
COMMUNITY
Start: 2022-10-24

## 2022-10-25 RX ORDER — GLIPIZIDE 10 MG/1
10 TABLET ORAL 2 TIMES DAILY
COMMUNITY
Start: 2022-10-10

## 2022-10-25 NOTE — PROGRESS NOTES
Heme/Onc Progress Note    PATIENT: Erma Law  MRN: [id number]  DATE: 10/25/2022  Chief Complaint: No Concerns today        Oncology History   Initial Consultation:5/15/17  Referring Physician:Dr Sam Lawson  Other Physicians:  Code Status:Not addressed     Diagnosis/Problem list:   T1bN0M0-Stage I left UOQ breast cancer (Dx 4/17), ER 97%, GA 90%. HER-2/jenny negative, DCIS component. 0/1SLN+    Present Treatment:  Arimidex 3/2021-present    Treatment history:    1) Left lumpectomy (4/26/17)  2) post lumpectomy XRT (8/14/17-9/13/17)- 5272cGy/21Fx  3) Femara ([id number])    Plan of care:  AI's  x > 5 yrs,      Clinical History: Ms Law is a pleasant lady kindly referred by Dr. Lawson, diagnosed with breast cancer when she was evaluated for an abnormal mammogram. On 2/8/17 patient underwent routine screening mammogram that was read first BI-RADS Category 2: Benign but Upon further review, asymmetry within the upper outer left breast posteriorly most conspicuous on the exaggerated CC lateral and no prior exaggerated CC lateral available for comparison to determine stability. It was changed to Cat 0: Further evaluation with spot compression views recommended. On 3/20/17 further views showed Suspicious 9 mm spiculated mass in the left upper outer quadrant. Nodular densities in the 6-7 o'clock left breast and left upper outer quadrant. BIRADS Category 0.  Incomplete.  RECOMMENDATION:  Needs further imaging evaluation.  Targeted left breast ultrasound is recommended for further evaluation. Given the appearance of the spiculated mass in the left breast, biopsy will likely be necessary.       On 4/3/17: left breast u/s showed an 8mm irregular hypoechoic lesion in the 2:00 left breast, correlating with the spiculated mass seen in the left upper outer quadrant on recent diagnostic mammogram. BIRADS Category 4: Suspicious. RECOMMENDATION: Ultrasound guided core needle biopsy of the suspicious mass in the  2:00 left breast is recommended. It was performed same day by Dr Eros Blank. pathology report LEFT BREAST LESION, UPPER OUTER QUADRANT: INVASIVE DUCTAL CARCINOMA, BLOOM NOGUEIRA GRADE 1. DUCTAL CARCINOMA IN SITU, LOW GRADE SOLID TYPE.  DCIS is present on all four biopsies, the largest focus measuring 0.7 cm.  Invasive carcinoma is present on three of the core biopsies, the largest focus measuring 0.5 cm.    She underwent left breast ultrasound-guided lumpectomy with left axillary sentinel node biopsy on 4/26/17 performed by Dr. Sam Lawson. Pathology showed INVASIVE DUCTAL CARCINOMA, GRADE I (MODIFIED BLOOM-NOUGEIRA).  Greatest tumor dimension, 0.8 cm. Lymphovascular invasion, not identified.  Perineural invasion, not identified. Surgical margins, uninvolved. DUCTAL CARCINOMA IN SITU, CRIBRIFORM AND SOLID, NUCLEAR GRADE 1.  Greatest dimension of DCIS, 0.2 cm.  SENTINEL LYMPH NODE, LYMPHADENECTOMY: ONE REACTIVE LYMPH NODE.  NO EVIDENCE OF NEOPLASM (0/1). FOCAL ATYPICAL DUCTAL HYPERPLASIA.     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 She have a trip coming soon to [place]. She is leaving early June.    Patient is feeling well today and does not have any complaints at all. She denies any fever, chills, sweats. No chest pain or shortness of breath. No changes in bowel habits. No bleeding.     Completed postlumpectomy XRT 9/17     Patient reports that her anemia has been extensively workup by Dr. Saavedra in [place] in the past and she was diasgnosed with anemia of chronic disease and she does  not want workup for anemia.     Left breast mammogram/US 2/13/2020  IMPRESSION: SUSPICIOUS OF MALIGNANCY  Oval mass in the 2:00 left breast is suspicious.  OVERALL STUDY BIRADS: 4 Suspicious for malignancy    Subsequent biopsy on 3/6/20 was NEGATIVE for malignancy.        Past Medical History:   Diagnosis Date    Breast cancer     DM (diabetes mellitus)     High cholesterol     HTN (hypertension)            Therapy Plan Information  Medications  denosumab (PROLIA) injection 60 mg  60 mg, Subcutaneous, Every 26 weeks    Review of Systems    Interval history, 10/25/22:  Patient presents today in f/u for breast cancer. She continues to take Arimidex and so far tolerating fair with the exception of joint pain but states that it is tolerable. Recent screening vasile bilateral breast on 4/13/2022: was BIRADS 2 benign.  She has been on endocrine therapy for 5 years now but would like to continue for now. She is on Prolia q6 months and is due Nov 2022. She denies any fever, chills, sweats.  No chest pain or shortness of breath.  No changes in self breast examination.      Objective:     Vitals:    10/25/22 1317   BP: 110/70   Pulse: 83   Temp: 98.1 °F (36.7 °C)         Physical Exam  Vitals reviewed.   Constitutional:       Appearance: She is obese.   HENT:      Head: Normocephalic and atraumatic.      Right Ear: External ear normal.      Left Ear: External ear normal.      Nose: Nose normal.      Mouth/Throat:      Mouth: Mucous membranes are moist.      Pharynx: Oropharynx is clear.   Eyes:      General: Lids are normal. Lids are everted, no foreign bodies appreciated. Vision grossly intact. Gaze aligned appropriately.   Cardiovascular:      Rate and Rhythm: Normal rate and regular rhythm.      Pulses: Normal pulses.      Heart sounds: Normal heart sounds.   Pulmonary:      Effort: Pulmonary effort is normal. No accessory muscle usage or respiratory distress.      Breath sounds: Normal breath sounds. No decreased breath sounds, wheezing, rhonchi or rales.   Chest:   Breasts:     Right: Normal. No inverted nipple, mass, nipple discharge or tenderness.      Left: No inverted nipple, mass, nipple discharge or tenderness.      Comments: Left breast lumpectomy. Dense and much smaller than right breast  Abdominal:      General: Bowel sounds are normal.      Palpations: Abdomen is soft.      Tenderness: There is no  abdominal tenderness.   Musculoskeletal:         General: Normal range of motion.      Cervical back: Normal range of motion and neck supple.      Right lower leg: No edema.      Left lower leg: No edema.   Feet:      Right foot:      Skin integrity: Skin integrity normal.      Left foot:      Skin integrity: Skin integrity normal.   Lymphadenopathy:      Cervical: No cervical adenopathy.      Upper Body:      Right upper body: No supraclavicular, axillary or pectoral adenopathy.      Left upper body: No supraclavicular, axillary or pectoral adenopathy.      Lower Body: No right inguinal adenopathy. No left inguinal adenopathy.   Skin:     General: Skin is warm and dry.      Capillary Refill: Capillary refill takes less than 2 seconds.      Findings: No rash.      Nails: There is no clubbing.   Neurological:      General: No focal deficit present.      Mental Status: She is alert and oriented to person, place, and time. Mental status is at baseline.      Deep Tendon Reflexes: Reflexes normal.   Psychiatric:         Attention and Perception: Attention and perception normal.         Mood and Affect: Mood normal.         Behavior: Behavior normal.       Assessment:     1) T1bN0M0-Stage I left UOQ breast cancer (Dx 4/17)   --ER 97%, ID 90%. HER-2/jenny negative,    --Left lumpectomy (4/26/17)   --Left breast infiltrating ductal carcinoma arising from  DCIS   --0/1SLN+   --post lumpectomy XRT (8/14/17-9/13/17)- 5272cGy/21Fx   -- Femara ([id number])--> severe arthralgias   --Arimidex 3/2021-present  Osteoporosis and AI's:  Discussed with the  patient that aromatase inhibitors are well known to decrease endogenous estrogens and cause bone mass loss, osteopenia/osteoporosis, and increase the risk of pathological fractures, therfore, bone health is very important. She may need to be on medications for bone loss prevention/treatment. Bone health that includes calcium 6194-1929 mg/day with vit D supplementation as well as  weight bearing exercise to reduce the risk of pathological fractures. Discussed bone density tests, the significance of it and that it will be performed q 2 years or as indicated to evaluate her bone mass. Also discussed fall precautions. The patient was given ample opportunity to ask questions and they were all answered to her satisfaction.  Pt on Prolia.  2) Anemia--due to chronic disease  3) Comorbidities: CKD, DM, HTN, Hypothyroidism  Problem List Items Addressed This Visit          Oncology    Malignant neoplasm of upper-outer quadrant of left breast in female, estrogen receptor positive - Primary    Relevant Orders    Mammo Digital Screening Bilat w/ Nnamdi    DXA Bone Density Spine And Hip       Orthopedic    Osteoporosis    Relevant Orders    DXA Bone Density Spine And Hip     Other Visit Diagnoses       Estrogen receptor positive        Relevant Orders    DXA Bone Density Spine And Hip    Encounter for screening        Relevant Orders    Mammo Digital Screening Bilat w/ Nnamdi    Encounter for screening mammogram for malignant neoplasm of breast        Relevant Orders    Mammo Digital Screening Bilat w/ Nnamdi              Plan:       Patient is doing well and with no clinical evidence of disease.    We will continue present treatment.  Continue Arimidex.   Cont Prolia due 11/2022  Mammogram due 4/2023- ordered  Bone density due in 4/2023- ordered  RTC 6 months with labs after mammo & bone density

## 2022-11-04 ENCOUNTER — INFUSION (OUTPATIENT)
Dept: INFUSION THERAPY | Facility: HOSPITAL | Age: 72
End: 2022-11-04
Attending: INTERNAL MEDICINE
Payer: MEDICARE

## 2022-11-04 VITALS
OXYGEN SATURATION: 97 % | TEMPERATURE: 98 F | SYSTOLIC BLOOD PRESSURE: 140 MMHG | HEART RATE: 81 BPM | DIASTOLIC BLOOD PRESSURE: 72 MMHG

## 2022-11-04 DIAGNOSIS — M81.0 OSTEOPOROSIS, UNSPECIFIED OSTEOPOROSIS TYPE, UNSPECIFIED PATHOLOGICAL FRACTURE PRESENCE: Primary | ICD-10-CM

## 2022-11-04 PROCEDURE — 96372 THER/PROPH/DIAG INJ SC/IM: CPT

## 2022-11-04 PROCEDURE — 63600175 PHARM REV CODE 636 W HCPCS: Mod: JG | Performed by: INTERNAL MEDICINE

## 2022-11-04 RX ADMIN — DENOSUMAB 60 MG: 60 INJECTION SUBCUTANEOUS at 01:11

## 2023-01-19 ENCOUNTER — HOSPITAL ENCOUNTER (EMERGENCY)
Facility: HOSPITAL | Age: 73
Discharge: HOME | End: 2023-01-20
Attending: FAMILY MEDICINE
Payer: MEDICARE

## 2023-01-19 VITALS
OXYGEN SATURATION: 98 % | TEMPERATURE: 98 F | WEIGHT: 228 LBS | HEIGHT: 65 IN | RESPIRATION RATE: 20 BRPM | SYSTOLIC BLOOD PRESSURE: 164 MMHG | HEART RATE: 83 BPM | BODY MASS INDEX: 37.99 KG/M2 | DIASTOLIC BLOOD PRESSURE: 89 MMHG

## 2023-01-19 DIAGNOSIS — R42 VERTIGO: Primary | ICD-10-CM

## 2023-01-19 DIAGNOSIS — R42 DIZZINESS: ICD-10-CM

## 2023-01-19 LAB
ALBUMIN SERPL-MCNC: 3.8 G/DL (ref 3.4–4.8)
ALBUMIN/GLOB SERPL: 1.1 RATIO (ref 1.1–2)
ALP SERPL-CCNC: 62 UNIT/L (ref 40–150)
ALT SERPL-CCNC: 14 UNIT/L (ref 0–55)
AMYLASE SERPL-CCNC: 65 UNIT/L (ref 20–160)
APPEARANCE UR: CLEAR
AST SERPL-CCNC: 17 UNIT/L (ref 5–34)
BACTERIA #/AREA URNS AUTO: ABNORMAL /HPF
BASOPHILS # BLD AUTO: 0.03 X10(3)/MCL (ref 0–0.2)
BASOPHILS NFR BLD AUTO: 0.3 %
BILIRUB UR QL STRIP.AUTO: NEGATIVE MG/DL
BILIRUBIN DIRECT+TOT PNL SERPL-MCNC: 0.2 MG/DL
BUN SERPL-MCNC: 41.9 MG/DL (ref 9.8–20.1)
CALCIUM SERPL-MCNC: 9.4 MG/DL (ref 8.4–10.2)
CHLORIDE SERPL-SCNC: 105 MMOL/L (ref 98–107)
CO2 SERPL-SCNC: 20 MMOL/L (ref 23–31)
COLOR UR AUTO: YELLOW
CREAT SERPL-MCNC: 1.31 MG/DL (ref 0.55–1.02)
EOSINOPHIL # BLD AUTO: 0.32 X10(3)/MCL (ref 0–0.9)
EOSINOPHIL NFR BLD AUTO: 3.4 %
ERYTHROCYTE [DISTWIDTH] IN BLOOD BY AUTOMATED COUNT: 12.4 % (ref 11.5–17)
FLUAV AG UPPER RESP QL IA.RAPID: NOT DETECTED
FLUBV AG UPPER RESP QL IA.RAPID: NOT DETECTED
GFR SERPLBLD CREATININE-BSD FMLA CKD-EPI: 44 MLS/MIN/1.73/M2
GLOBULIN SER-MCNC: 3.4 GM/DL (ref 2.4–3.5)
GLUCOSE SERPL-MCNC: 134 MG/DL (ref 82–115)
GLUCOSE UR QL STRIP.AUTO: 100 MG/DL
HCT VFR BLD AUTO: 33.4 % (ref 37–47)
HGB BLD-MCNC: 10.3 GM/DL (ref 12–16)
IMM GRANULOCYTES # BLD AUTO: 0.01 X10(3)/MCL (ref 0–0.04)
IMM GRANULOCYTES NFR BLD AUTO: 0.1 %
KETONES UR QL STRIP.AUTO: NEGATIVE MG/DL
LEUKOCYTE ESTERASE UR QL STRIP.AUTO: NEGATIVE UNIT/L
LIPASE SERPL-CCNC: 16 U/L
LYMPHOCYTES # BLD AUTO: 3.98 X10(3)/MCL (ref 0.6–4.6)
LYMPHOCYTES NFR BLD AUTO: 42.2 %
MCH RBC QN AUTO: 29.5 PG
MCHC RBC AUTO-ENTMCNC: 30.8 MG/DL (ref 33–36)
MCV RBC AUTO: 95.7 FL (ref 80–94)
MONOCYTES # BLD AUTO: 0.66 X10(3)/MCL (ref 0.1–1.3)
MONOCYTES NFR BLD AUTO: 7 %
NEUTROPHILS # BLD AUTO: 4.43 X10(3)/MCL (ref 2.1–9.2)
NEUTROPHILS NFR BLD AUTO: 47 %
NITRITE UR QL STRIP.AUTO: NEGATIVE
PH UR STRIP.AUTO: 6.5 [PH]
PLATELET # BLD AUTO: 251 X10(3)/MCL (ref 130–400)
PMV BLD AUTO: 9.7 FL (ref 7.4–10.4)
POC CARDIAC TROPONIN I: 0 NG/ML (ref 0–0.08)
POTASSIUM SERPL-SCNC: 3.6 MMOL/L (ref 3.5–5.1)
PROT SERPL-MCNC: 7.2 GM/DL (ref 5.8–7.6)
PROT UR QL STRIP.AUTO: NEGATIVE MG/DL
RBC # BLD AUTO: 3.49 X10(6)/MCL (ref 4.2–5.4)
RBC #/AREA URNS AUTO: ABNORMAL /HPF
RBC UR QL AUTO: NEGATIVE UNIT/L
SAMPLE: NORMAL
SARS-COV-2 RNA RESP QL NAA+PROBE: NOT DETECTED
SODIUM SERPL-SCNC: 138 MMOL/L (ref 136–145)
SP GR UR STRIP.AUTO: 1.02
SQUAMOUS #/AREA URNS AUTO: ABNORMAL /HPF
UROBILINOGEN UR STRIP-ACNC: 0.2 MG/DL
WBC # SPEC AUTO: 9.4 X10(3)/MCL (ref 4.5–11.5)
WBC #/AREA URNS AUTO: ABNORMAL /HPF

## 2023-01-19 PROCEDURE — 93010 EKG 12-LEAD: ICD-10-PCS | Mod: ,,, | Performed by: INTERNAL MEDICINE

## 2023-01-19 PROCEDURE — 80053 COMPREHEN METABOLIC PANEL: CPT | Performed by: FAMILY MEDICINE

## 2023-01-19 PROCEDURE — 84484 ASSAY OF TROPONIN QUANT: CPT

## 2023-01-19 PROCEDURE — 85025 COMPLETE CBC W/AUTO DIFF WBC: CPT | Performed by: FAMILY MEDICINE

## 2023-01-19 PROCEDURE — 25000003 PHARM REV CODE 250: Performed by: FAMILY MEDICINE

## 2023-01-19 PROCEDURE — 96361 HYDRATE IV INFUSION ADD-ON: CPT

## 2023-01-19 PROCEDURE — 81003 URINALYSIS AUTO W/O SCOPE: CPT | Performed by: FAMILY MEDICINE

## 2023-01-19 PROCEDURE — 63600175 PHARM REV CODE 636 W HCPCS: Performed by: FAMILY MEDICINE

## 2023-01-19 PROCEDURE — 83690 ASSAY OF LIPASE: CPT | Performed by: FAMILY MEDICINE

## 2023-01-19 PROCEDURE — 82150 ASSAY OF AMYLASE: CPT | Performed by: FAMILY MEDICINE

## 2023-01-19 PROCEDURE — 96374 THER/PROPH/DIAG INJ IV PUSH: CPT

## 2023-01-19 PROCEDURE — 0240U COVID/FLU A&B PCR: CPT | Performed by: FAMILY MEDICINE

## 2023-01-19 PROCEDURE — 99285 EMERGENCY DEPT VISIT HI MDM: CPT | Mod: 25

## 2023-01-19 PROCEDURE — 93005 ELECTROCARDIOGRAM TRACING: CPT

## 2023-01-19 PROCEDURE — 93010 ELECTROCARDIOGRAM REPORT: CPT | Mod: ,,, | Performed by: INTERNAL MEDICINE

## 2023-01-19 RX ORDER — MECLIZINE HYDROCHLORIDE 25 MG/1
50 TABLET ORAL
Status: COMPLETED | OUTPATIENT
Start: 2023-01-20 | End: 2023-01-19

## 2023-01-19 RX ORDER — ONDANSETRON 2 MG/ML
4 INJECTION INTRAMUSCULAR; INTRAVENOUS
Status: COMPLETED | OUTPATIENT
Start: 2023-01-19 | End: 2023-01-19

## 2023-01-19 RX ORDER — MECLIZINE HYDROCHLORIDE 25 MG/1
25 TABLET ORAL 3 TIMES DAILY PRN
Qty: 15 TABLET | Refills: 0 | Status: SHIPPED | OUTPATIENT
Start: 2023-01-19 | End: 2023-01-24

## 2023-01-19 RX ADMIN — MECLIZINE HYDROCHLORIDE 50 MG: 25 TABLET ORAL at 11:01

## 2023-01-19 RX ADMIN — SODIUM CHLORIDE 1000 ML: 9 INJECTION, SOLUTION INTRAVENOUS at 10:01

## 2023-01-19 RX ADMIN — ONDANSETRON HYDROCHLORIDE 4 MG: 2 SOLUTION INTRAMUSCULAR; INTRAVENOUS at 10:01

## 2023-01-20 PROCEDURE — 25000003 PHARM REV CODE 250: Performed by: FAMILY MEDICINE

## 2023-01-20 NOTE — ED PROVIDER NOTES
Encounter Date: 1/19/2023       History     Chief Complaint   Patient presents with    Dizziness    Nausea    Vomiting     PT ARRIVED VIA [facility] UNIT 005.PT C/O DIZZINESS, NAUSEA AND VOMITING THAT STARTED ABOUT  HOURS AGO. PATIENT HAS HISTORY OF MIGRAINES AND THE LIGHT BOTHERS THE PATIENT.G RIGHT HAND,MG ZOFRAN AND   ML SALINE ON BOARD PER EMS.      Patient arrived via ambulance for vomiting and dizziness patient states that she just suddenly developed dizziness with no other symptoms no near-syncope no syncope patient otherwise received 4 Zofran in 300 mils of fluids on the ambulance and still episodes of vomiting bright light is aggravating to the patient she does have a history of migraines also patient has no fever chills or night sweats      Review of patient's allergies indicates:   Allergen Reactions    Levofloxacin     Codeine Nausea And Vomiting     Past Medical History:   Diagnosis Date    Breast cancer     DM (diabetes mellitus)     High cholesterol     HTN (hypertension)      Past Surgical History:   Procedure Laterality Date    LEFT BREAST LUMPECTOMY      WITH SENTINAL NODE BIOPSY    TONSILLECTOMY      TUBAL LIGATION       Family History   Problem Relation Age of Onset    Breast cancer Maternal Aunt      Social History     Tobacco Use    Smoking status: Never    Smokeless tobacco: Never   Substance Use Topics    Alcohol use: Never    Drug use: Never     Review of Systems   Constitutional:  Negative for fever.   HENT:  Negative for sore throat.    Respiratory:  Negative for shortness of breath.    Cardiovascular:  Negative for chest pain.   Gastrointestinal:  Positive for nausea and vomiting.   Genitourinary:  Negative for dysuria.   Musculoskeletal:  Negative for back pain.   Skin:  Negative for rash.   Neurological:  Positive for dizziness. Negative for weakness.   Hematological:  Does not bruise/bleed easily.     Physical Exam     Initial Vitals [01/19/23 2158]   BP Pulse Resp Temp SpO2   (!) 141/67 82  16 98.1 °F (36.7 °C) 99 %      MAP       --         Physical Exam    Nursing note and vitals reviewed.  Constitutional: She appears well-developed.   HENT:   Head: Normocephalic and atraumatic.   Right Ear: External ear normal.   Left Ear: External ear normal.   Nose: Nose normal.   Mouth/Throat: Oropharynx is clear and moist. No oropharyngeal exudate.   Eyes: Conjunctivae and EOM are normal. Pupils are equal, round, and reactive to light. Right eye exhibits no discharge. Left eye exhibits no discharge.   Neck: Neck supple. No tracheal deviation present. No JVD present.   Normal range of motion.  Cardiovascular:  Normal rate, regular rhythm, normal heart sounds and intact distal pulses.     Exam reveals no gallop and no friction rub.       No murmur heard.  Pulmonary/Chest: Breath sounds normal. No stridor. No respiratory distress. She has no wheezes. She has no rhonchi. She has no rales.   Abdominal: Abdomen is soft. Bowel sounds are normal. She exhibits no distension and no mass. There is no abdominal tenderness. There is no rebound and no guarding.   Musculoskeletal:         General: Normal range of motion.      Cervical back: Normal range of motion and neck supple.     Neurological: She is alert and oriented to person, place, and time. She has normal strength. She displays normal reflexes. No cranial nerve deficit or sensory deficit. GCS score is 15. GCS eye subscore is 4. GCS verbal subscore is 5. GCS motor subscore is 6.   Skin: Skin is warm and dry. No rash and no abscess noted. No erythema.   Psychiatric: She has a normal mood and affect. Her behavior is normal. Judgment and thought content normal.       ED Course   Procedures  Labs Reviewed   COMPREHENSIVE METABOLIC PANEL - Abnormal; Notable for the following components:       Result Value    Carbon Dioxide 20 (*)     Glucose Level 134 (*)     Blood Urea Nitrogen 41.9 (*)     Creatinine 1.31 (*)     All other components within normal limits   URINALYSIS,  REFLEX TO URINE CULTURE - Abnormal; Notable for the following components:    Glucose,  (*)     All other components within normal limits   CBC WITH DIFFERENTIAL - Abnormal; Notable for the following components:    RBC 3.49 (*)     Hgb 10.3 (*)     Hct 33.4 (*)     MCV 95.7 (*)     MCHC 30.8 (*)     All other components within normal limits   URINALYSIS, MICROSCOPIC - Abnormal; Notable for the following components:    Squamous Epithelial Cells, UA Few (*)     All other components within normal limits   LIPASE - Normal   COVID/FLU A&B PCR - Normal    Narrative:     The Xpert Xpress SARS-CoV-2/FLU/RSV plus is a rapid, multiplexed real-time PCR test intended for the simultaneous qualitative detection and differentiation of SARS-CoV-2, Influenza A, Influenza B, and respiratory syncytial virus (RSV) viral RNA in either nasopharyngeal swab or nasal swab specimens.         AMYLASE - Normal   CBC W/ AUTO DIFFERENTIAL    Narrative:     The following orders were created for panel order CBC W/ AUTO DIFFERENTIAL.  Procedure                               Abnormality         Status                     ---------                               -----------         ------                     CBC with Differential[[id number]]        Abnormal            Final result                 Please view results for these tests on the individual orders.   TROPONIN ISTAT          Imaging Results              CT Head Without Contrast (Preliminary result)  Result time 01/19/23 23:11:22      Preliminary result by Carlito Partida Jr., MD (01/19/23 23:11:22)                   Narrative:    START OF REPORT:  TECHNIQUE: CT OF THE HEAD WAS PERFORMED WITHOUT INTRAVENOUS CONTRAST WITH AXIAL AS WELL AS CORONAL AND SAGITTAL IMAGES.    COMPARISON: NONE.    DOSAGE INFORMATION: AUTOMATED EXPOSURE CONTROL WAS UTILIZED.    CLINICAL HISTORY: DIZZINESS.    Findings:  Hemorrhage: No acute intracranial hemorrhage is seen.  CSF spaces: The ventricles, sulci and  basal cisterns all appear mildly prominent consistent with global cerebral atrophy.  Brain parenchyma: Unremarkable with preservation of the grey white junction throughout.  Cerebellum: Unremarkable.  Sella and skull base: The sella appears to be within normal limits for age.  Herniation: None.  Intracranial calcifications: Incidental note is made of bilateral choroid plexus calcification. Incidental note is made of some pineal region calcification. Incidental note is made of some calcification of the falx. Incidental note is made of subtle bilateral basal ganglia calcifcation.  Calvarium: No acute linear or depressed skull fracture is seen.    Maxillofacial Structures:  Paranasal sinuses: A retention cyst or polyp is seen in the right frontal sinus. Bilateral mastoid air cells and the other visualized paranasal sinuses are clear.  Orbits: The orbits appear unremarkable.  Temporal bones and mastoids: The temporal bones and mastoids appear unremarkable.  TMJ: The mandibular condyles appear normally placed with respect to the mandibular fossa.      Impression:  1. No acute intracranial process identified. Details as above.                          Preliminary result by Interface, Rad Results In (01/19/23 23:11:22)                   Narrative:    START OF REPORT:  TECHNIQUE: CT OF THE HEAD WAS PERFORMED WITHOUT INTRAVENOUS CONTRAST WITH AXIAL AS WELL AS CORONAL AND SAGITTAL IMAGES.    COMPARISON: NONE.    DOSAGE INFORMATION: AUTOMATED EXPOSURE CONTROL WAS UTILIZED.    CLINICAL HISTORY: DIZZINESS.    Findings:  Hemorrhage: No acute intracranial hemorrhage is seen.  CSF spaces: The ventricles, sulci and basal cisterns all appear mildly prominent consistent with global cerebral atrophy.  Brain parenchyma: Unremarkable with preservation of the grey white junction throughout.  Cerebellum: Unremarkable.  Sella and skull base: The sella appears to be within normal limits for age.  Herniation: None.  Intracranial  calcifications: Incidental note is made of bilateral choroid plexus calcification. Incidental note is made of some pineal region calcification. Incidental note is made of some calcification of the falx. Incidental note is made of subtle bilateral basal ganglia calcifcation.  Calvarium: No acute linear or depressed skull fracture is seen.    Maxillofacial Structures:  Paranasal sinuses: A retention cyst or polyp is seen in the right frontal sinus. Bilateral mastoid air cells and the other visualized paranasal sinuses are clear.  Orbits: The orbits appear unremarkable.  Temporal bones and mastoids: The temporal bones and mastoids appear unremarkable.  TMJ: The mandibular condyles appear normally placed with respect to the mandibular fossa.      Impression:  1. No acute intracranial process identified. Details as above.                                         Medications   meclizine tablet 50 mg (has no administration in time range)   ondansetron injection 4 mg (4 mg Intravenous Given 1/19/23 2214)   sodium chloride 0.9% bolus 1,000 mL 1,000 mL (0 mLs Intravenous Stopped 1/19/23 2314)     Medical Decision Making:   Differential Diagnosis:   Vertigo vertigo dizziness stroke MI dehydration                        Clinical Impression:   Final diagnoses:  [R42] Dizziness  [R42] Vertigo (Primary)        ED Disposition Condition    Discharge Stable          ED Prescriptions       Medication Sig Dispense Start Date End Date Auth. Provider    meclizine (ANTIVERT) 25 mg tablet Take 1 tablet (25 mg total) by mouth 3 (three) times daily as needed. 15 tablet 1/19/2023 1/24/2023 Guillermo Javier MD          Follow-up Information    None          Guillermo Javier MD  01/19/23 9307

## 2023-04-17 ENCOUNTER — HOSPITAL ENCOUNTER (OUTPATIENT)
Dept: RADIOLOGY | Facility: HOSPITAL | Age: 73
Discharge: HOME | End: 2023-04-17
Attending: NURSE PRACTITIONER
Payer: MEDICARE

## 2023-04-17 DIAGNOSIS — C50.412 MALIGNANT NEOPLASM OF UPPER-OUTER QUADRANT OF LEFT BREAST IN FEMALE, ESTROGEN RECEPTOR POSITIVE: ICD-10-CM

## 2023-04-17 DIAGNOSIS — Z17.0 ESTROGEN RECEPTOR POSITIVE: ICD-10-CM

## 2023-04-17 DIAGNOSIS — Z12.31 ENCOUNTER FOR SCREENING MAMMOGRAM FOR MALIGNANT NEOPLASM OF BREAST: ICD-10-CM

## 2023-04-17 DIAGNOSIS — Z13.9 ENCOUNTER FOR SCREENING: ICD-10-CM

## 2023-04-17 DIAGNOSIS — M81.0 OSTEOPOROSIS, UNSPECIFIED OSTEOPOROSIS TYPE, UNSPECIFIED PATHOLOGICAL FRACTURE PRESENCE: ICD-10-CM

## 2023-04-17 DIAGNOSIS — Z17.0 MALIGNANT NEOPLASM OF UPPER-OUTER QUADRANT OF LEFT BREAST IN FEMALE, ESTROGEN RECEPTOR POSITIVE: ICD-10-CM

## 2023-04-17 PROCEDURE — 77067 SCR MAMMO BI INCL CAD: CPT | Mod: 26,,, | Performed by: RADIOLOGY

## 2023-04-17 PROCEDURE — 77080 DXA BONE DENSITY AXIAL: CPT | Mod: 26,,, | Performed by: RADIOLOGY

## 2023-04-17 PROCEDURE — 77080 DXA BONE DENSITY AXIAL: CPT | Mod: XU,TC

## 2023-04-17 PROCEDURE — 77080 DXA BONE DENSITY AXIAL SKELETON 1 OR MORE SITES: ICD-10-PCS | Mod: 26,,, | Performed by: RADIOLOGY

## 2023-04-17 PROCEDURE — 77067 SCR MAMMO BI INCL CAD: CPT | Mod: TC

## 2023-04-17 PROCEDURE — 77063 MAMMO DIGITAL SCREENING BILAT WITH TOMO: ICD-10-PCS | Mod: 26,,, | Performed by: RADIOLOGY

## 2023-04-17 PROCEDURE — 77063 BREAST TOMOSYNTHESIS BI: CPT | Mod: 26,,, | Performed by: RADIOLOGY

## 2023-04-17 PROCEDURE — 77067 MAMMO DIGITAL SCREENING BILAT WITH TOMO: ICD-10-PCS | Mod: 26,,, | Performed by: RADIOLOGY

## 2023-04-19 ENCOUNTER — LAB VISIT (OUTPATIENT)
Dept: LAB | Facility: HOSPITAL | Age: 73
End: 2023-04-19
Attending: INTERNAL MEDICINE
Payer: MEDICARE

## 2023-04-19 DIAGNOSIS — Z17.0 ESTROGEN RECEPTOR POSITIVE: ICD-10-CM

## 2023-04-19 DIAGNOSIS — Z12.31 ENCOUNTER FOR SCREENING MAMMOGRAM FOR MALIGNANT NEOPLASM OF BREAST: ICD-10-CM

## 2023-04-19 DIAGNOSIS — M81.0 OSTEOPOROSIS, UNSPECIFIED OSTEOPOROSIS TYPE, UNSPECIFIED PATHOLOGICAL FRACTURE PRESENCE: ICD-10-CM

## 2023-04-19 DIAGNOSIS — Z13.9 ENCOUNTER FOR SCREENING: ICD-10-CM

## 2023-04-19 DIAGNOSIS — Z17.0 MALIGNANT NEOPLASM OF UPPER-OUTER QUADRANT OF LEFT BREAST IN FEMALE, ESTROGEN RECEPTOR POSITIVE: ICD-10-CM

## 2023-04-19 DIAGNOSIS — C50.412 MALIGNANT NEOPLASM OF UPPER-OUTER QUADRANT OF LEFT BREAST IN FEMALE, ESTROGEN RECEPTOR POSITIVE: ICD-10-CM

## 2023-04-19 LAB
ALBUMIN SERPL-MCNC: 3.8 G/DL (ref 3.4–4.8)
ALBUMIN/GLOB SERPL: 1.3 RATIO (ref 1.1–2)
ALP SERPL-CCNC: 76 UNIT/L (ref 40–150)
ALT SERPL-CCNC: 16 UNIT/L (ref 0–55)
AST SERPL-CCNC: 17 UNIT/L (ref 5–34)
BASOPHILS # BLD AUTO: 0.03 X10(3)/MCL (ref 0–0.2)
BASOPHILS NFR BLD AUTO: 0.4 %
BILIRUBIN DIRECT+TOT PNL SERPL-MCNC: 0.3 MG/DL
BUN SERPL-MCNC: 39.7 MG/DL (ref 9.8–20.1)
CALCIUM SERPL-MCNC: 9.5 MG/DL (ref 8.4–10.2)
CHLORIDE SERPL-SCNC: 107 MMOL/L (ref 98–107)
CO2 SERPL-SCNC: 23 MMOL/L (ref 23–31)
CREAT SERPL-MCNC: 1.17 MG/DL (ref 0.55–1.02)
EOSINOPHIL # BLD AUTO: 0.17 X10(3)/MCL (ref 0–0.9)
EOSINOPHIL NFR BLD AUTO: 2.1 %
ERYTHROCYTE [DISTWIDTH] IN BLOOD BY AUTOMATED COUNT: 12.2 % (ref 11.5–17)
GFR SERPLBLD CREATININE-BSD FMLA CKD-EPI: 50 MLS/MIN/1.73/M2
GLOBULIN SER-MCNC: 2.9 GM/DL (ref 2.4–3.5)
GLUCOSE SERPL-MCNC: 105 MG/DL (ref 82–115)
HCT VFR BLD AUTO: 32 % (ref 37–47)
HGB BLD-MCNC: 10.4 G/DL (ref 12–16)
IMM GRANULOCYTES # BLD AUTO: 0.01 X10(3)/MCL (ref 0–0.04)
IMM GRANULOCYTES NFR BLD AUTO: 0.1 %
LYMPHOCYTES # BLD AUTO: 2.19 X10(3)/MCL (ref 0.6–4.6)
LYMPHOCYTES NFR BLD AUTO: 27.4 %
MCH RBC QN AUTO: 31.1 PG (ref 27–31)
MCHC RBC AUTO-ENTMCNC: 32.5 G/DL (ref 33–36)
MCV RBC AUTO: 95.8 FL (ref 80–94)
MONOCYTES # BLD AUTO: 0.5 X10(3)/MCL (ref 0.1–1.3)
MONOCYTES NFR BLD AUTO: 6.3 %
NEUTROPHILS # BLD AUTO: 5.1 X10(3)/MCL (ref 2.1–9.2)
NEUTROPHILS NFR BLD AUTO: 63.7 %
PLATELET # BLD AUTO: 233 X10(3)/MCL (ref 130–400)
PMV BLD AUTO: 9.4 FL (ref 7.4–10.4)
POTASSIUM SERPL-SCNC: 4.3 MMOL/L (ref 3.5–5.1)
PROT SERPL-MCNC: 6.7 GM/DL (ref 5.8–7.6)
RBC # BLD AUTO: 3.34 X10(6)/MCL (ref 4.2–5.4)
SODIUM SERPL-SCNC: 139 MMOL/L (ref 136–145)
WBC # SPEC AUTO: 8 X10(3)/MCL (ref 4.5–11.5)

## 2023-04-19 PROCEDURE — 85025 COMPLETE CBC W/AUTO DIFF WBC: CPT

## 2023-04-19 PROCEDURE — 86300 IMMUNOASSAY TUMOR CA 15-3: CPT | Mod: 90

## 2023-04-19 PROCEDURE — 80053 COMPREHEN METABOLIC PANEL: CPT

## 2023-04-19 PROCEDURE — 36415 COLL VENOUS BLD VENIPUNCTURE: CPT

## 2023-04-20 ENCOUNTER — HOSPITAL ENCOUNTER (OUTPATIENT)
Dept: RADIOLOGY | Facility: HOSPITAL | Age: 73
Discharge: HOME | End: 2023-04-20
Attending: NURSE PRACTITIONER
Payer: MEDICARE

## 2023-04-20 PROCEDURE — 77081 DXA BONE DENSITY APPENDICULR: CPT | Mod: TC

## 2023-04-20 PROCEDURE — 77081 DEXA BONE DENSITY APPENDICULAR SKELETON: ICD-10-PCS | Mod: 26,,, | Performed by: RADIOLOGY

## 2023-04-20 PROCEDURE — 77081 DXA BONE DENSITY APPENDICULR: CPT | Mod: 26,,, | Performed by: RADIOLOGY

## 2023-04-22 LAB — CANCER AG27-29 SERPL-ACNC: <12 U/ML

## 2023-04-26 ENCOUNTER — TELEPHONE (OUTPATIENT)
Dept: HEMATOLOGY/ONCOLOGY | Facility: CLINIC | Age: 73
End: 2023-04-26

## 2023-04-26 ENCOUNTER — OFFICE VISIT (OUTPATIENT)
Dept: HEMATOLOGY/ONCOLOGY | Facility: CLINIC | Age: 73
End: 2023-04-26
Payer: MEDICARE

## 2023-04-26 VITALS
DIASTOLIC BLOOD PRESSURE: 76 MMHG | HEIGHT: 65 IN | HEART RATE: 75 BPM | BODY MASS INDEX: 37.01 KG/M2 | OXYGEN SATURATION: 97 % | TEMPERATURE: 98 F | SYSTOLIC BLOOD PRESSURE: 121 MMHG | WEIGHT: 222.13 LBS

## 2023-04-26 DIAGNOSIS — Z17.0 MALIGNANT NEOPLASM OF UPPER-OUTER QUADRANT OF LEFT BREAST IN FEMALE, ESTROGEN RECEPTOR POSITIVE: ICD-10-CM

## 2023-04-26 DIAGNOSIS — C50.412 MALIGNANT NEOPLASM OF UPPER-OUTER QUADRANT OF LEFT BREAST IN FEMALE, ESTROGEN RECEPTOR POSITIVE: ICD-10-CM

## 2023-04-26 DIAGNOSIS — M81.8 OTHER OSTEOPOROSIS WITHOUT CURRENT PATHOLOGICAL FRACTURE: ICD-10-CM

## 2023-04-26 DIAGNOSIS — Z12.31 SCREENING MAMMOGRAM FOR HIGH-RISK PATIENT: Primary | ICD-10-CM

## 2023-04-26 DIAGNOSIS — Z79.811 AROMATASE INHIBITOR USE: ICD-10-CM

## 2023-04-26 PROCEDURE — 1126F PR PAIN SEVERITY QUANTIFIED, NO PAIN PRESENT: ICD-10-PCS | Mod: CPTII,S$GLB,, | Performed by: INTERNAL MEDICINE

## 2023-04-26 PROCEDURE — 3074F PR MOST RECENT SYSTOLIC BLOOD PRESSURE < 130 MM HG: ICD-10-PCS | Mod: CPTII,S$GLB,, | Performed by: INTERNAL MEDICINE

## 2023-04-26 PROCEDURE — 3078F DIAST BP <80 MM HG: CPT | Mod: CPTII,S$GLB,, | Performed by: INTERNAL MEDICINE

## 2023-04-26 PROCEDURE — 1101F PT FALLS ASSESS-DOCD LE1/YR: CPT | Mod: CPTII,S$GLB,, | Performed by: INTERNAL MEDICINE

## 2023-04-26 PROCEDURE — 3008F BODY MASS INDEX DOCD: CPT | Mod: CPTII,S$GLB,, | Performed by: INTERNAL MEDICINE

## 2023-04-26 PROCEDURE — 3288F PR FALLS RISK ASSESSMENT DOCUMENTED: ICD-10-PCS | Mod: CPTII,S$GLB,, | Performed by: INTERNAL MEDICINE

## 2023-04-26 PROCEDURE — 99999 PR PBB SHADOW E&M-EST. PATIENT-LVL IV: CPT | Mod: PBBFAC,,, | Performed by: INTERNAL MEDICINE

## 2023-04-26 PROCEDURE — 99999 PR PBB SHADOW E&M-EST. PATIENT-LVL IV: ICD-10-PCS | Mod: PBBFAC,,, | Performed by: INTERNAL MEDICINE

## 2023-04-26 PROCEDURE — 4010F ACE/ARB THERAPY RXD/TAKEN: CPT | Mod: CPTII,S$GLB,, | Performed by: INTERNAL MEDICINE

## 2023-04-26 PROCEDURE — 1126F AMNT PAIN NOTED NONE PRSNT: CPT | Mod: CPTII,S$GLB,, | Performed by: INTERNAL MEDICINE

## 2023-04-26 PROCEDURE — 3074F SYST BP LT 130 MM HG: CPT | Mod: CPTII,S$GLB,, | Performed by: INTERNAL MEDICINE

## 2023-04-26 PROCEDURE — 1160F PR REVIEW ALL MEDS BY PRESCRIBER/CLIN PHARMACIST DOCUMENTED: ICD-10-PCS | Mod: CPTII,S$GLB,, | Performed by: INTERNAL MEDICINE

## 2023-04-26 PROCEDURE — 4010F PR ACE/ARB THEARPY RXD/TAKEN: ICD-10-PCS | Mod: CPTII,S$GLB,, | Performed by: INTERNAL MEDICINE

## 2023-04-26 PROCEDURE — 1159F PR MEDICATION LIST DOCUMENTED IN MEDICAL RECORD: ICD-10-PCS | Mod: CPTII,S$GLB,, | Performed by: INTERNAL MEDICINE

## 2023-04-26 PROCEDURE — 99214 PR OFFICE/OUTPT VISIT, EST, LEVL IV, 30-39 MIN: ICD-10-PCS | Mod: S$GLB,,, | Performed by: INTERNAL MEDICINE

## 2023-04-26 PROCEDURE — 1101F PR PT FALLS ASSESS DOC 0-1 FALLS W/OUT INJ PAST YR: ICD-10-PCS | Mod: CPTII,S$GLB,, | Performed by: INTERNAL MEDICINE

## 2023-04-26 PROCEDURE — 1160F RVW MEDS BY RX/DR IN RCRD: CPT | Mod: CPTII,S$GLB,, | Performed by: INTERNAL MEDICINE

## 2023-04-26 PROCEDURE — 99214 OFFICE O/P EST MOD 30 MIN: CPT | Mod: S$GLB,,, | Performed by: INTERNAL MEDICINE

## 2023-04-26 PROCEDURE — 3078F PR MOST RECENT DIASTOLIC BLOOD PRESSURE < 80 MM HG: ICD-10-PCS | Mod: CPTII,S$GLB,, | Performed by: INTERNAL MEDICINE

## 2023-04-26 PROCEDURE — 3008F PR BODY MASS INDEX (BMI) DOCUMENTED: ICD-10-PCS | Mod: CPTII,S$GLB,, | Performed by: INTERNAL MEDICINE

## 2023-04-26 PROCEDURE — 3288F FALL RISK ASSESSMENT DOCD: CPT | Mod: CPTII,S$GLB,, | Performed by: INTERNAL MEDICINE

## 2023-04-26 PROCEDURE — 1159F MED LIST DOCD IN RCRD: CPT | Mod: CPTII,S$GLB,, | Performed by: INTERNAL MEDICINE

## 2023-04-26 RX ORDER — SEMAGLUTIDE 2.68 MG/ML
INJECTION, SOLUTION SUBCUTANEOUS
COMMUNITY
Start: 2023-04-01

## 2023-04-26 RX ORDER — EMPAGLIFLOZIN 25 MG/1
TABLET, FILM COATED ORAL
COMMUNITY
Start: 2023-04-17

## 2023-04-26 NOTE — PROGRESS NOTES
HEMATOLOGY/ONCOLOGY OFFICE CLINIC VISIT    Visit Information:      Initial Consultation:5/15/17  Referring Physician:Dr Sam Lawson  Other Physicians:  Code Status:Not addressed     Diagnosis/Problem list:   T1bN0M0-Stage I left UOQ breast cancer (Dx 4/17), ER 97%, VA 90%. HER-2/jenny negative, DCIS component. 0/1SLN+    Present Treatment:  Arimidex 3/2021-present    Treatment history:    1) Left lumpectomy (4/26/17)  2) post lumpectomy XRT (8/14/17-9/13/17)- 5272cGy/21Fx  3) Femara ([id number])    Plan of care:  AI's  x > 5 yrs,        Imaging:    Bone Density 4/19/2023: Low bone mass.    Screening Mammogram with JERRY 4/19/2023: IMPRESSION: BENIGN  There is no mammographic evidence of malignancy.    RECOMMENDATIONS:   A routine screening mammogram in one year in the absence of significant clinical findings in the interval is recommended (April 2024).      Pathology:    4/26/17: [1]  LEFT BREAST, LUMPECTOMY:   A)  INVASIVE DUCTAL CARCINOMA, GRADE I (MODIFIED BLOOM-NOGUEIRA).  --  Greatest tumor dimension, 0.8 cm.  --  Tubule score:  2  --  Nuclear score: 1  --  Mitotic score:1 (2 mitoses / 10 HPFs).   --  Total Dylan score:  4/9 (Grade III).   --  Lymphovascular invasion, not identified.  --  Perineural invasion, not identified.  --  Surgical margins, uninvolved.   --  Closest margin, deep, within 2.7 mm from the invasive carcinoma.   --  Distance of invasive carcinoma from the lateral margin, 8 mm.   --  Distance of invasive carcinoma from the medial margin, 12.5 mm.     NOTE: The deep and encompassing margin was reexcised (specimen #3).  B)  DUCTAL CARCINOMA IN SITU, CRIBRIFORM AND SOLID, NUCLEAR GRADE 1.  --  Greatest dimension of DCIS, 0.2 cm.  --  Surgical margins, uninvolved.   --  Closest margin, medial, 3 mm from the DCIS.    NOTE: This focus is at least 1 cm away from the site of invasive carcinoma.    [2]  SENTINEL LYMPH NODE, LYMPHADENECTOMY:   ONE REACTIVE LYMPH NODE.  NO EVIDENCE OF NEOPLASM  (0/1).  [3]  LEFT BREAST, RE-EXCISION OF THE DEEP AND ENCOMPASSING MARGIN:  A)  NO EVIDENCE OF INVASIVE CARCINOMA OR DCIS.   B)  FOCAL ATYPICAL DUCTAL HYPERPLASIA.   C)  FIBROCYSTIC CHANGE.     STAGE (pTNM) (Note M):           TNM Descriptors:  Not applicable         Primary Tumor (Invasive Carcinoma) (pT):  PT1b:  Tumor > 5 mm but <= 10 mm in greatest dimension         Regional Lymph Nodes (pN) (choose a category based on lymph nodes received with the specimen;  immunohistochemistry and / or molecular studies are not required):                 Category (pN):  PN0: No regional lymph node metastasis identified histologically         Distant Metastasis (pM):  Not applicable   ADDITIONAL NON-TUMOR:  Additional Pathologic Findings:  Atypical ductal hyperplasia, Fibrocystic change.       4/3/17: LEFT BREAST LESION, UPPER OUTER QUADRANT: INVASIVE DUCTAL CARCINOMA, BLOOM NOGUEIRA GRADE 1.  DUCTAL CARCINOMA IN SITU, LOW GRADE SOLID TYPE.   COMMENT:  DCIS is present on all four biopsies, the largest focus measuring 0.7 cm.  Invasive carcinoma is present on three of the core biopsies, the largest focus measuring 0.5 cm. The results of ER, MI and Her2 jenny analysis will be the subject of an addendum report.  This case was reviewed in intradepartmental consultation.       Breast Tumor Prognostic Profile    TEST DESCRIPTION RESULT  INTERPRETATION     Estrogen Receptor    97.36 %   Positive     Progesterone Receptor    90.13 %   Positive     [id number]-2 oncoprotein   1+   Negative        CLINICAL HISTORY:       Patient:Ms Law is a pleasant lady kindly referred by Dr. Lawson, diagnosed with breast cancer when she was evaluated for an abnormal mammogram. On 2/8/17 patient underwent routine screening mammogram that was read first BI-RADS Category 2: Benign but Upon further review, asymmetry within the upper outer left breast posteriorly most conspicuous on the exaggerated CC lateral and no prior exaggerated CC lateral  available for comparison to determine stability. It was changed to Cat 0: Further evaluation with spot compression views recommended. On 3/20/17 further views showed Suspicious 9 mm spiculated mass in the left upper outer quadrant. Nodular densities in the 6-7 o'clock left breast and left upper outer quadrant. BIRADS Category 0.  Incomplete.  RECOMMENDATION:  Needs further imaging evaluation.  Targeted left breast ultrasound is recommended for further evaluation. Given the appearance of the spiculated mass in the left breast, biopsy will likely be necessary.       On 4/3/17: left breast u/s showed an 8mm irregular hypoechoic lesion in the 2:00 left breast, correlating with the spiculated mass seen in the left upper outer quadrant on recent diagnostic mammogram. BIRADS Category 4: Suspicious. RECOMMENDATION: Ultrasound guided core needle biopsy of the suspicious mass in the 2:00 left breast is recommended. It was performed same day by Dr Eros Blank. pathology report LEFT BREAST LESION, UPPER OUTER QUADRANT: INVASIVE DUCTAL CARCINOMA, BLOOM NOGUEIRA GRADE 1. DUCTAL CARCINOMA IN SITU, LOW GRADE SOLID TYPE.  DCIS is present on all four biopsies, the largest focus measuring 0.7 cm.  Invasive carcinoma is present on three of the core biopsies, the largest focus measuring 0.5 cm.    She underwent left breast ultrasound-guided lumpectomy with left axillary sentinel node biopsy on 4/26/17 performed by Dr. Sam Lawson. Pathology showed INVASIVE DUCTAL CARCINOMA, GRADE I (MODIFIED BLOOM-NOGUEIRA).  Greatest tumor dimension, 0.8 cm. Lymphovascular invasion, not identified.  Perineural invasion, not identified. Surgical margins, uninvolved. DUCTAL CARCINOMA IN SITU, CRIBRIFORM AND SOLID, NUCLEAR GRADE 1.  Greatest dimension of DCIS, 0.2 cm.  SENTINEL LYMPH NODE, LYMPHADENECTOMY: ONE REACTIVE LYMPH NODE.  NO EVIDENCE OF NEOPLASM (0/1). FOCAL ATYPICAL DUCTAL HYPERPLASIA.     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 She have a trip coming soon to [place]. She is leaving early June.    Patient is feeling well today and does not have any complaints at all. She denies any fever, chills, sweats. No chest pain or shortness of breath. No changes in bowel habits. No bleeding.     Completed postlumpectomy XRT 9/17     Patient reports that her anemia has been extensively workup by Dr. Saavedra in [place] in the past and she was diasgnosed with anemia of chronic disease and she does  not want workup for anemia.     Left breast mammogram/US 2/13/2020  IMPRESSION: SUSPICIOUS OF MALIGNANCY  Oval mass in the 2:00 left breast is suspicious.  OVERALL STUDY BIRADS: 4 Suspicious for malignancy    Subsequent biopsy on 3/6/20 was NEGATIVE for malignancy.          Chief Complaint: OTHER (No concerns today.)      Interval History:    Patient presents today for 6 month f/u of her breast cancer. She is taking Arimidex and tolerating well. Recent screening vasile bilateral breast on 4/19/2023: was BIRADS 2 benign.  Bone density on 4/19/23 shows low bone mass but osteopenia now.  She is on Prolia every 6 months and is due 5/5/2023.  She denies any changes in self breast exams. Overall, she is doing well.        ROS: All 14 points ROS taken and positive as per Interval History, all other negative.  Review of Systems   Constitutional:  Negative for chills, fever and weight loss.   HENT:  Negative for congestion and nosebleeds.    Eyes:  Negative for blurred vision, double vision and photophobia.   Respiratory:  Negative for cough, hemoptysis and shortness of breath.    Cardiovascular:  Negative for chest pain, palpitations, leg swelling and PND.   Gastrointestinal:  Negative for abdominal pain, blood in stool, constipation, diarrhea, melena, nausea and vomiting.   Genitourinary:  Negative for dysuria, frequency, hematuria and urgency.   Musculoskeletal:  Negative for back pain, falls and myalgias.   Skin:  Negative for itching and rash.  "  Neurological:  Negative for tremors, focal weakness, seizures, weakness and headaches.   Endo/Heme/Allergies:  Negative for environmental allergies. Does not bruise/bleed easily.   Psychiatric/Behavioral:  Negative for depression and suicidal ideas. The patient is not nervous/anxious.        Histories:  PMH/PSH/FH/SOCIAL/ALLERGIES AND MEDS REVIEWED AND UPDATED AS APPROPRIATE       Vitals:    04/26/23 1035   BP: 121/76   BP Location: Right arm   Patient Position: Sitting   Pulse: 75   Temp: 97.9 °F (36.6 °C)   TempSrc: Oral   SpO2: 97%   Weight: 100.7 kg (222 lb 1.6 oz)   Height: 5' 5" (1.651 m)      Wt Readings from Last 6 Encounters:   04/26/23 100.7 kg (222 lb 1.6 oz)   01/19/23 103.4 kg (228 lb)   10/25/22 102.5 kg (226 lb)   05/04/22 99.8 kg (220 lb)     Body mass index is 36.96 kg/m².  Body surface area is 2.15 meters squared.  Physical Exam  Constitutional:       Appearance: Normal appearance.   HENT:      Head: Normocephalic and atraumatic.   Eyes:      General: No scleral icterus.     Extraocular Movements: Extraocular movements intact.      Conjunctiva/sclera: Conjunctivae normal.   Neck:      Vascular: No JVD.   Cardiovascular:      Rate and Rhythm: Normal rate and regular rhythm.      Heart sounds: No murmur heard.  Pulmonary:      Effort: Pulmonary effort is normal.      Breath sounds: Normal breath sounds. No wheezing or rhonchi.   Chest:   Breasts:     Right: Normal. No swelling, mass, nipple discharge, skin change or tenderness.      Left: Normal. No swelling, mass, nipple discharge, skin change or tenderness.   Abdominal:      General: Bowel sounds are normal. There is no distension.      Palpations: Abdomen is soft. There is no mass.      Tenderness: There is no abdominal tenderness.   Musculoskeletal:      Cervical back: Neck supple.   Lymphadenopathy:      Head:      Right side of head: No submandibular adenopathy.      Left side of head: No submandibular adenopathy.      Cervical: No cervical " adenopathy.      Upper Body:      Right upper body: No supraclavicular or axillary adenopathy.      Left upper body: No supraclavicular or axillary adenopathy.      Lower Body: No right inguinal adenopathy. No left inguinal adenopathy.   Skin:     General: Skin is warm.      Coloration: Skin is not jaundiced.      Findings: No lesion or rash.      Nails: There is no clubbing.   Neurological:      Mental Status: She is alert and oriented to person, place, and time.      Cranial Nerves: Cranial nerves 2-12 are intact.   Psychiatric:         Attention and Perception: Attention normal.         Behavior: Behavior is cooperative.         Judgment: Judgment normal.     ECOG SCORE    0 - Fully active-able to carry on all pre-disease performance without restriction         Laboratory:  CBC with Differential:  Lab Results   Component Value Date    WBC 8.0 04/19/2023    RBC 3.34 (L) 04/19/2023    HGB 10.4 (L) 04/19/2023    HCT 32.0 (L) 04/19/2023    MCV 95.8 (H) 04/19/2023    MCH 31.1 (H) 04/19/2023    MCHC 32.5 (L) 04/19/2023    RDW 12.2 04/19/2023     04/19/2023    MPV 9.4 04/19/2023        CMP:  Sodium Level   Date Value Ref Range Status   04/19/2023 139 136 - 145 mmol/L Final     Potassium Level   Date Value Ref Range Status   04/19/2023 4.3 3.5 - 5.1 mmol/L Final     Carbon Dioxide   Date Value Ref Range Status   04/19/2023 23 23 - 31 mmol/L Final     Blood Urea Nitrogen   Date Value Ref Range Status   04/19/2023 39.7 (H) 9.8 - 20.1 mg/dL Final     Creatinine   Date Value Ref Range Status   04/19/2023 1.17 (H) 0.55 - 1.02 mg/dL Final     Calcium Level Total   Date Value Ref Range Status   04/19/2023 9.5 8.4 - 10.2 mg/dL Final     Albumin Level   Date Value Ref Range Status   04/19/2023 3.8 3.4 - 4.8 g/dL Final     Bilirubin Total   Date Value Ref Range Status   04/19/2023 0.3 <=1.5 mg/dL Final     Alkaline Phosphatase   Date Value Ref Range Status   04/19/2023 76 40 - 150 unit/L Final     Aspartate Aminotransferase    Date Value Ref Range Status   04/19/2023 17 5 - 34 unit/L Final     Alanine Aminotransferase   Date Value Ref Range Status   04/19/2023 16 0 - 55 unit/L Final     Estimated GFR-Non    Date Value Ref Range Status   04/14/2022 46           Assessment:       1) T1bN0M0-Stage I left UOQ breast cancer (Dx 4/17)   --ER 97%, VA 90%. HER-2/jenny negative,    --Left lumpectomy (4/26/17)   --Left breast infiltrating ductal carcinoma arising from  DCIS   --0/1SLN+   --post lumpectomy XRT (8/14/17-9/13/17)- 5272cGy/21Fx   -- Femara ([id number])--> severe arthralgias   --Arimidex 3/2021-present  Osteoporosis and AI's:  Discussed with the  patient that aromatase inhibitors are well known to decrease endogenous estrogens and cause bone mass loss, osteopenia/osteoporosis, and increase the risk of pathological fractures, therfore, bone health is very important. She may need to be on medications for bone loss prevention/treatment. Bone health that includes calcium 4761-4564 mg/day with vit D supplementation as well as weight bearing exercise to reduce the risk of pathological fractures. Discussed bone density tests, the significance of it and that it will be performed q 2 years or as indicated to evaluate her bone mass. Also discussed fall precautions. The patient was given ample opportunity to ask questions and they were all answered to her satisfaction.  Pt on Prolia.  Most recent bone density with osteopenia  2) Anemia--due to chronic disease  3) Comorbidities: CKD, DM, HTN, Hypothyroidism        Plan:       Patient is doing well and with no clinical evidence of disease.  We will continue present treatment.  Continue Arimidex  Continue Prolia every 6 months - due 5/5/2023  Mammogram due 4/2024 - ordered  Bone density due in 4/2025 - will order when closer  Continue Calcium + Vit D and weight bearing exercises  Keep follow ups with other physicians  RTC 6 months with NP, labs prior (CBC, CMP, CA 27-29)      Encouraged to call with questions or problems  The patient was given ample opportunity to ask questions and they were all answered to satisfaction; patient demonstrated understanding of what we discussed and is agreeable to the plan.    Iqra Nathan MD  Hematology/Oncology      Professional Services   I, Ashli Cook LPN, acted solely as a scribe for and in the presence of Dr. Iqra Nathan, who performed these services.

## 2023-05-08 ENCOUNTER — INFUSION (OUTPATIENT)
Dept: INFUSION THERAPY | Facility: HOSPITAL | Age: 73
End: 2023-05-08
Attending: INTERNAL MEDICINE
Payer: MEDICARE

## 2023-05-08 VITALS
SYSTOLIC BLOOD PRESSURE: 148 MMHG | BODY MASS INDEX: 36.65 KG/M2 | TEMPERATURE: 98 F | OXYGEN SATURATION: 95 % | HEIGHT: 65 IN | HEART RATE: 92 BPM | DIASTOLIC BLOOD PRESSURE: 75 MMHG | WEIGHT: 220 LBS

## 2023-05-08 DIAGNOSIS — M81.0 OSTEOPOROSIS, UNSPECIFIED OSTEOPOROSIS TYPE, UNSPECIFIED PATHOLOGICAL FRACTURE PRESENCE: Primary | ICD-10-CM

## 2023-05-08 PROCEDURE — 63600175 PHARM REV CODE 636 W HCPCS: Mod: JZ,JG | Performed by: NURSE PRACTITIONER

## 2023-05-08 PROCEDURE — 96372 THER/PROPH/DIAG INJ SC/IM: CPT

## 2023-05-08 RX ADMIN — DENOSUMAB 60 MG: 60 INJECTION SUBCUTANEOUS at 10:05

## 2023-11-16 DIAGNOSIS — Z17.0 MALIGNANT NEOPLASM OF BREAST IN FEMALE, ESTROGEN RECEPTOR POSITIVE, UNSPECIFIED LATERALITY, UNSPECIFIED SITE OF BREAST: ICD-10-CM

## 2023-11-16 DIAGNOSIS — C50.919 MALIGNANT NEOPLASM OF BREAST IN FEMALE, ESTROGEN RECEPTOR POSITIVE, UNSPECIFIED LATERALITY, UNSPECIFIED SITE OF BREAST: ICD-10-CM

## 2023-11-16 RX ORDER — ANASTROZOLE 1 MG/1
TABLET ORAL
Qty: 90 TABLET | Refills: 0 | Status: SHIPPED | OUTPATIENT
Start: 2023-11-16 | End: 2024-02-08

## 2023-11-24 ENCOUNTER — LAB VISIT (OUTPATIENT)
Dept: LAB | Facility: HOSPITAL | Age: 73
End: 2023-11-24
Attending: INTERNAL MEDICINE
Payer: MEDICARE

## 2023-11-24 DIAGNOSIS — Z79.811 AROMATASE INHIBITOR USE: ICD-10-CM

## 2023-11-24 DIAGNOSIS — Z17.0 MALIGNANT NEOPLASM OF UPPER-OUTER QUADRANT OF LEFT BREAST IN FEMALE, ESTROGEN RECEPTOR POSITIVE: ICD-10-CM

## 2023-11-24 DIAGNOSIS — M81.8 OTHER OSTEOPOROSIS WITHOUT CURRENT PATHOLOGICAL FRACTURE: ICD-10-CM

## 2023-11-24 DIAGNOSIS — C50.412 MALIGNANT NEOPLASM OF UPPER-OUTER QUADRANT OF LEFT BREAST IN FEMALE, ESTROGEN RECEPTOR POSITIVE: ICD-10-CM

## 2023-11-24 LAB
ALBUMIN SERPL-MCNC: 3.6 G/DL (ref 3.4–4.8)
ALBUMIN/GLOB SERPL: 1.2 RATIO (ref 1.1–2)
ALP SERPL-CCNC: 59 UNIT/L (ref 40–150)
ALT SERPL-CCNC: 16 UNIT/L (ref 0–55)
AST SERPL-CCNC: 15 UNIT/L (ref 5–34)
BASOPHILS # BLD AUTO: 0.02 X10(3)/MCL
BASOPHILS NFR BLD AUTO: 0.3 %
BILIRUB SERPL-MCNC: 0.3 MG/DL
BUN SERPL-MCNC: 34.7 MG/DL (ref 9.8–20.1)
CALCIUM SERPL-MCNC: 9.8 MG/DL (ref 8.4–10.2)
CHLORIDE SERPL-SCNC: 105 MMOL/L (ref 98–107)
CO2 SERPL-SCNC: 27 MMOL/L (ref 23–31)
CREAT SERPL-MCNC: 1.07 MG/DL (ref 0.55–1.02)
EOSINOPHIL # BLD AUTO: 0.1 X10(3)/MCL (ref 0–0.9)
EOSINOPHIL NFR BLD AUTO: 1.6 %
ERYTHROCYTE [DISTWIDTH] IN BLOOD BY AUTOMATED COUNT: 12.7 % (ref 11.5–17)
GFR SERPLBLD CREATININE-BSD FMLA CKD-EPI: 56 MLS/MIN/1.73/M2
GLOBULIN SER-MCNC: 3.1 GM/DL (ref 2.4–3.5)
GLUCOSE SERPL-MCNC: 71 MG/DL (ref 82–115)
HCT VFR BLD AUTO: 34.2 % (ref 37–47)
HGB BLD-MCNC: 10.5 G/DL (ref 12–16)
IMM GRANULOCYTES # BLD AUTO: 0.01 X10(3)/MCL (ref 0–0.04)
IMM GRANULOCYTES NFR BLD AUTO: 0.2 %
LYMPHOCYTES # BLD AUTO: 1.91 X10(3)/MCL (ref 0.6–4.6)
LYMPHOCYTES NFR BLD AUTO: 30.8 %
MCH RBC QN AUTO: 30.1 PG (ref 27–31)
MCHC RBC AUTO-ENTMCNC: 30.7 G/DL (ref 33–36)
MCV RBC AUTO: 98 FL (ref 80–94)
MONOCYTES # BLD AUTO: 0.48 X10(3)/MCL (ref 0.1–1.3)
MONOCYTES NFR BLD AUTO: 7.7 %
NEUTROPHILS # BLD AUTO: 3.68 X10(3)/MCL (ref 2.1–9.2)
NEUTROPHILS NFR BLD AUTO: 59.4 %
PLATELET # BLD AUTO: 282 X10(3)/MCL (ref 130–400)
PMV BLD AUTO: 8.9 FL (ref 7.4–10.4)
POTASSIUM SERPL-SCNC: 4.2 MMOL/L (ref 3.5–5.1)
PROT SERPL-MCNC: 6.7 GM/DL (ref 5.8–7.6)
RBC # BLD AUTO: 3.49 X10(6)/MCL (ref 4.2–5.4)
SODIUM SERPL-SCNC: 139 MMOL/L (ref 136–145)
WBC # SPEC AUTO: 6.2 X10(3)/MCL (ref 4.5–11.5)

## 2023-11-24 PROCEDURE — 36415 COLL VENOUS BLD VENIPUNCTURE: CPT

## 2023-11-24 PROCEDURE — 85025 COMPLETE CBC W/AUTO DIFF WBC: CPT

## 2023-11-24 PROCEDURE — 80053 COMPREHEN METABOLIC PANEL: CPT

## 2023-11-24 PROCEDURE — 86300 IMMUNOASSAY TUMOR CA 15-3: CPT

## 2023-11-27 LAB — CANCER AG27-29 SERPL-ACNC: 12.4 U/ML

## 2023-11-29 ENCOUNTER — INFUSION (OUTPATIENT)
Dept: INFUSION THERAPY | Facility: HOSPITAL | Age: 73
End: 2023-11-29
Attending: INTERNAL MEDICINE
Payer: MEDICARE

## 2023-11-29 ENCOUNTER — OFFICE VISIT (OUTPATIENT)
Dept: HEMATOLOGY/ONCOLOGY | Facility: CLINIC | Age: 73
End: 2023-11-29
Payer: MEDICARE

## 2023-11-29 VITALS
HEIGHT: 65 IN | HEART RATE: 75 BPM | DIASTOLIC BLOOD PRESSURE: 78 MMHG | BODY MASS INDEX: 36.35 KG/M2 | SYSTOLIC BLOOD PRESSURE: 130 MMHG | TEMPERATURE: 98 F | WEIGHT: 218.19 LBS | OXYGEN SATURATION: 99 % | RESPIRATION RATE: 18 BRPM

## 2023-11-29 DIAGNOSIS — C50.412 MALIGNANT NEOPLASM OF UPPER-OUTER QUADRANT OF LEFT BREAST IN FEMALE, ESTROGEN RECEPTOR POSITIVE: Primary | ICD-10-CM

## 2023-11-29 DIAGNOSIS — Z17.0 MALIGNANT NEOPLASM OF UPPER-OUTER QUADRANT OF LEFT BREAST IN FEMALE, ESTROGEN RECEPTOR POSITIVE: Primary | ICD-10-CM

## 2023-11-29 DIAGNOSIS — M81.0 OSTEOPOROSIS, UNSPECIFIED OSTEOPOROSIS TYPE, UNSPECIFIED PATHOLOGICAL FRACTURE PRESENCE: Primary | ICD-10-CM

## 2023-11-29 DIAGNOSIS — Z79.811 AROMATASE INHIBITOR USE: ICD-10-CM

## 2023-11-29 DIAGNOSIS — M81.8 OTHER OSTEOPOROSIS WITHOUT CURRENT PATHOLOGICAL FRACTURE: ICD-10-CM

## 2023-11-29 PROCEDURE — 99215 OFFICE O/P EST HI 40 MIN: CPT | Mod: S$GLB,,, | Performed by: NURSE PRACTITIONER

## 2023-11-29 PROCEDURE — 1101F PR PT FALLS ASSESS DOC 0-1 FALLS W/OUT INJ PAST YR: ICD-10-PCS | Mod: CPTII,S$GLB,, | Performed by: NURSE PRACTITIONER

## 2023-11-29 PROCEDURE — 99999 PR PBB SHADOW E&M-EST. PATIENT-LVL IV: CPT | Mod: PBBFAC,,, | Performed by: NURSE PRACTITIONER

## 2023-11-29 PROCEDURE — 96372 THER/PROPH/DIAG INJ SC/IM: CPT

## 2023-11-29 PROCEDURE — 3288F FALL RISK ASSESSMENT DOCD: CPT | Mod: CPTII,S$GLB,, | Performed by: NURSE PRACTITIONER

## 2023-11-29 PROCEDURE — 1126F AMNT PAIN NOTED NONE PRSNT: CPT | Mod: CPTII,S$GLB,, | Performed by: NURSE PRACTITIONER

## 2023-11-29 PROCEDURE — 4010F PR ACE/ARB THEARPY RXD/TAKEN: ICD-10-PCS | Mod: CPTII,S$GLB,, | Performed by: NURSE PRACTITIONER

## 2023-11-29 PROCEDURE — 63600175 PHARM REV CODE 636 W HCPCS: Mod: JZ,JG | Performed by: NURSE PRACTITIONER

## 2023-11-29 PROCEDURE — 3078F DIAST BP <80 MM HG: CPT | Mod: CPTII,S$GLB,, | Performed by: NURSE PRACTITIONER

## 2023-11-29 PROCEDURE — 4010F ACE/ARB THERAPY RXD/TAKEN: CPT | Mod: CPTII,S$GLB,, | Performed by: NURSE PRACTITIONER

## 2023-11-29 PROCEDURE — 1159F PR MEDICATION LIST DOCUMENTED IN MEDICAL RECORD: ICD-10-PCS | Mod: CPTII,S$GLB,, | Performed by: NURSE PRACTITIONER

## 2023-11-29 PROCEDURE — 3288F PR FALLS RISK ASSESSMENT DOCUMENTED: ICD-10-PCS | Mod: CPTII,S$GLB,, | Performed by: NURSE PRACTITIONER

## 2023-11-29 PROCEDURE — 3008F PR BODY MASS INDEX (BMI) DOCUMENTED: ICD-10-PCS | Mod: CPTII,S$GLB,, | Performed by: NURSE PRACTITIONER

## 2023-11-29 PROCEDURE — 99215 PR OFFICE/OUTPT VISIT, EST, LEVL V, 40-54 MIN: ICD-10-PCS | Mod: S$GLB,,, | Performed by: NURSE PRACTITIONER

## 2023-11-29 PROCEDURE — 3078F PR MOST RECENT DIASTOLIC BLOOD PRESSURE < 80 MM HG: ICD-10-PCS | Mod: CPTII,S$GLB,, | Performed by: NURSE PRACTITIONER

## 2023-11-29 PROCEDURE — 1101F PT FALLS ASSESS-DOCD LE1/YR: CPT | Mod: CPTII,S$GLB,, | Performed by: NURSE PRACTITIONER

## 2023-11-29 PROCEDURE — 3075F SYST BP GE 130 - 139MM HG: CPT | Mod: CPTII,S$GLB,, | Performed by: NURSE PRACTITIONER

## 2023-11-29 PROCEDURE — 1126F PR PAIN SEVERITY QUANTIFIED, NO PAIN PRESENT: ICD-10-PCS | Mod: CPTII,S$GLB,, | Performed by: NURSE PRACTITIONER

## 2023-11-29 PROCEDURE — 1159F MED LIST DOCD IN RCRD: CPT | Mod: CPTII,S$GLB,, | Performed by: NURSE PRACTITIONER

## 2023-11-29 PROCEDURE — 3075F PR MOST RECENT SYSTOLIC BLOOD PRESS GE 130-139MM HG: ICD-10-PCS | Mod: CPTII,S$GLB,, | Performed by: NURSE PRACTITIONER

## 2023-11-29 PROCEDURE — 3008F BODY MASS INDEX DOCD: CPT | Mod: CPTII,S$GLB,, | Performed by: NURSE PRACTITIONER

## 2023-11-29 PROCEDURE — 99999 PR PBB SHADOW E&M-EST. PATIENT-LVL IV: ICD-10-PCS | Mod: PBBFAC,,, | Performed by: NURSE PRACTITIONER

## 2023-11-29 RX ADMIN — DENOSUMAB 60 MG: 60 INJECTION SUBCUTANEOUS at 12:11

## 2023-11-29 NOTE — PROGRESS NOTES
HEMATOLOGY/ONCOLOGY OFFICE CLINIC VISIT    Visit Information:      Initial Consultation:5/15/17  Referring Physician:Dr Sam Lawson  Other Physicians:  Code Status:Not addressed     Diagnosis/Problem list:   T1bN0M0-Stage I left UOQ breast cancer (Dx 4/17), ER 97%, IN 90%. HER-2/jenny negative, DCIS component. 0/1SLN+    Present Treatment:  Arimidex 3/2021-present    Treatment history:    1) Left lumpectomy (4/26/17)  2) post lumpectomy XRT (8/14/17-9/13/17)- 5272cGy/21Fx  3) Femara ([id number])    Plan of care:  AI's  x > 5 yrs,        Imaging:    Bone Density 4/19/2023: Low bone mass.    Screening Mammogram with JERRY 4/19/2023: IMPRESSION: BENIGN  There is no mammographic evidence of malignancy.    RECOMMENDATIONS:   A routine screening mammogram in one year in the absence of significant clinical findings in the interval is recommended (April 2024).      Pathology:    4/26/17: [1]  LEFT BREAST, LUMPECTOMY:   A)  INVASIVE DUCTAL CARCINOMA, GRADE I (MODIFIED BLOOM-NOGUEIRA).  --  Greatest tumor dimension, 0.8 cm.  --  Tubule score:  2  --  Nuclear score: 1  --  Mitotic score:1 (2 mitoses / 10 HPFs).   --  Total Dylan score:  4/9 (Grade III).   --  Lymphovascular invasion, not identified.  --  Perineural invasion, not identified.  --  Surgical margins, uninvolved.   --  Closest margin, deep, within 2.7 mm from the invasive carcinoma.   --  Distance of invasive carcinoma from the lateral margin, 8 mm.   --  Distance of invasive carcinoma from the medial margin, 12.5 mm.     NOTE: The deep and encompassing margin was reexcised (specimen #3).  B)  DUCTAL CARCINOMA IN SITU, CRIBRIFORM AND SOLID, NUCLEAR GRADE 1.  --  Greatest dimension of DCIS, 0.2 cm.  --  Surgical margins, uninvolved.   --  Closest margin, medial, 3 mm from the DCIS.    NOTE: This focus is at least 1 cm away from the site of invasive carcinoma.    [2]  SENTINEL LYMPH NODE, LYMPHADENECTOMY:   ONE REACTIVE LYMPH NODE.  NO EVIDENCE OF NEOPLASM  (0/1).  [3]  LEFT BREAST, RE-EXCISION OF THE DEEP AND ENCOMPASSING MARGIN:  A)  NO EVIDENCE OF INVASIVE CARCINOMA OR DCIS.   B)  FOCAL ATYPICAL DUCTAL HYPERPLASIA.   C)  FIBROCYSTIC CHANGE.     STAGE (pTNM) (Note M):           TNM Descriptors:  Not applicable         Primary Tumor (Invasive Carcinoma) (pT):  PT1b:  Tumor > 5 mm but <= 10 mm in greatest dimension         Regional Lymph Nodes (pN) (choose a category based on lymph nodes received with the specimen;  immunohistochemistry and / or molecular studies are not required):                 Category (pN):  PN0: No regional lymph node metastasis identified histologically         Distant Metastasis (pM):  Not applicable   ADDITIONAL NON-TUMOR:  Additional Pathologic Findings:  Atypical ductal hyperplasia, Fibrocystic change.       4/3/17: LEFT BREAST LESION, UPPER OUTER QUADRANT: INVASIVE DUCTAL CARCINOMA, BLOOM NOGUEIRA GRADE 1.  DUCTAL CARCINOMA IN SITU, LOW GRADE SOLID TYPE.   COMMENT:  DCIS is present on all four biopsies, the largest focus measuring 0.7 cm.  Invasive carcinoma is present on three of the core biopsies, the largest focus measuring 0.5 cm. The results of ER, HI and Her2 jenny analysis will be the subject of an addendum report.  This case was reviewed in intradepartmental consultation.       Breast Tumor Prognostic Profile    TEST DESCRIPTION RESULT  INTERPRETATION     Estrogen Receptor    97.36 %   Positive     Progesterone Receptor    90.13 %   Positive     Ign0oqt/c-erb-2 oncoprotein   1+   Negative        CLINICAL HISTORY:       Patient:Ms Law is a pleasant lady kindly referred by Dr. Lawson, diagnosed with breast cancer when she was evaluated for an abnormal mammogram. On 2/8/17 patient underwent routine screening mammogram that was read first BI-RADS Category 2: Benign but Upon further review, asymmetry within the upper outer left breast posteriorly most conspicuous on the exaggerated CC lateral and no prior exaggerated CC lateral  available for comparison to determine stability. It was changed to Cat 0: Further evaluation with spot compression views recommended. On 3/20/17 further views showed Suspicious 9 mm spiculated mass in the left upper outer quadrant. Nodular densities in the 6-7 o'clock left breast and left upper outer quadrant. BIRADS Category 0.  Incomplete.  RECOMMENDATION:  Needs further imaging evaluation.  Targeted left breast ultrasound is recommended for further evaluation. Given the appearance of the spiculated mass in the left breast, biopsy will likely be necessary.       On 4/3/17: left breast u/s showed an 8mm irregular hypoechoic lesion in the 2:00 left breast, correlating with the spiculated mass seen in the left upper outer quadrant on recent diagnostic mammogram. BIRADS Category 4: Suspicious. RECOMMENDATION: Ultrasound guided core needle biopsy of the suspicious mass in the 2:00 left breast is recommended. It was performed same day by Dr Eros Blank. pathology report LEFT BREAST LESION, UPPER OUTER QUADRANT: INVASIVE DUCTAL CARCINOMA, BLOOM NOGUEIRA GRADE 1. DUCTAL CARCINOMA IN SITU, LOW GRADE SOLID TYPE.  DCIS is present on all four biopsies, the largest focus measuring 0.7 cm.  Invasive carcinoma is present on three of the core biopsies, the largest focus measuring 0.5 cm.    She underwent left breast ultrasound-guided lumpectomy with left axillary sentinel node biopsy on 4/26/17 performed by Dr. Sam Lawson. Pathology showed INVASIVE DUCTAL CARCINOMA, GRADE I (MODIFIED BLOOM-NOGUEIRA).  Greatest tumor dimension, 0.8 cm. Lymphovascular invasion, not identified.  Perineural invasion, not identified. Surgical margins, uninvolved. DUCTAL CARCINOMA IN SITU, CRIBRIFORM AND SOLID, NUCLEAR GRADE 1.  Greatest dimension of DCIS, 0.2 cm.  SENTINEL LYMPH NODE, LYMPHADENECTOMY: ONE REACTIVE LYMPH NODE.  NO EVIDENCE OF NEOPLASM (0/1). FOCAL ATYPICAL DUCTAL HYPERPLASIA.     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 She have a trip coming soon to [place]. She is leaving early June.    Patient is feeling well today and does not have any complaints at all. She denies any fever, chills, sweats. No chest pain or shortness of breath. No changes in bowel habits. No bleeding.     Completed postlumpectomy XRT 9/17     Patient reports that her anemia has been extensively workup by Dr. Saavedra in [place] in the past and she was diasgnosed with anemia of chronic disease and she does  not want workup for anemia.     Left breast mammogram/US 2/13/2020  IMPRESSION: SUSPICIOUS OF MALIGNANCY  Oval mass in the 2:00 left breast is suspicious.  OVERALL STUDY BIRADS: 4 Suspicious for malignancy    Subsequent biopsy on 3/6/20 was NEGATIVE for malignancy.          Chief Complaint: Follow-up (No concerns today.)      Interval History:    Patient presents today for 6 month f/u. She is taking Arimidex and tolerating well. Last screening vasile bilateral breast on 4/19/2023: was BIRADS 2 benign.  Bone density on 4/19/23 shows low bone mass but osteopenia now.  She is on Prolia every 6 months and is due today.  She denies any changes in self breast exams. Overall, she is doing well.        ROS: All 14 points ROS taken and positive as per Interval History, all other negative.  Review of Systems   Constitutional:  Negative for chills, fever and weight loss.   HENT:  Negative for congestion and nosebleeds.    Eyes:  Negative for blurred vision, double vision and photophobia.   Respiratory:  Negative for cough, hemoptysis and shortness of breath.    Cardiovascular:  Negative for chest pain, palpitations, leg swelling and PND.   Gastrointestinal:  Negative for abdominal pain, blood in stool, constipation, diarrhea, melena, nausea and vomiting.   Genitourinary:  Negative for dysuria, frequency, hematuria and urgency.   Musculoskeletal:  Negative for back pain, falls and myalgias.   Skin:  Negative for itching and rash.   Neurological:   "Negative for tremors, focal weakness, seizures, weakness and headaches.   Endo/Heme/Allergies:  Negative for environmental allergies. Does not bruise/bleed easily.   Psychiatric/Behavioral:  Negative for depression and suicidal ideas. The patient is not nervous/anxious.          Histories:  PMH/PSH/FH/SOCIAL/ALLERGIES AND MEDS REVIEWED AND UPDATED AS APPROPRIATE       Vitals:    11/29/23 1139   BP: 130/78   BP Location: Right arm   Patient Position: Sitting   Pulse: 75   Resp: 18   Temp: 98.3 °F (36.8 °C)   TempSrc: Oral   SpO2: 99%   Weight: 99 kg (218 lb 3.2 oz)   Height: 5' 5" (1.651 m)        Wt Readings from Last 6 Encounters:   11/29/23 99 kg (218 lb 3.2 oz)   05/08/23 99.8 kg (220 lb)   04/26/23 100.7 kg (222 lb 1.6 oz)   01/19/23 103.4 kg (228 lb)   10/25/22 102.5 kg (226 lb)   05/04/22 99.8 kg (220 lb)     Body mass index is 36.31 kg/m².  Body surface area is 2.13 meters squared.  Physical Exam  Constitutional:       Appearance: Normal appearance.   HENT:      Head: Normocephalic and atraumatic.   Eyes:      General: No scleral icterus.     Extraocular Movements: Extraocular movements intact.      Conjunctiva/sclera: Conjunctivae normal.   Neck:      Vascular: No JVD.   Cardiovascular:      Rate and Rhythm: Normal rate and regular rhythm.      Heart sounds: No murmur heard.  Pulmonary:      Effort: Pulmonary effort is normal.      Breath sounds: Normal breath sounds. No wheezing or rhonchi.   Chest:   Breasts:     Right: Normal. No swelling, mass, nipple discharge, skin change or tenderness.      Left: Normal. No swelling, mass, nipple discharge, skin change or tenderness.   Abdominal:      General: Bowel sounds are normal. There is no distension.      Palpations: Abdomen is soft. There is no mass.      Tenderness: There is no abdominal tenderness.   Musculoskeletal:      Cervical back: Neck supple.   Lymphadenopathy:      Head:      Right side of head: No submandibular adenopathy.      Left side of head: " No submandibular adenopathy.      Cervical: No cervical adenopathy.      Upper Body:      Right upper body: No supraclavicular or axillary adenopathy.      Left upper body: No supraclavicular or axillary adenopathy.      Lower Body: No right inguinal adenopathy. No left inguinal adenopathy.   Skin:     General: Skin is warm.      Coloration: Skin is not jaundiced.      Findings: No lesion or rash.      Nails: There is no clubbing.   Neurological:      Mental Status: She is alert and oriented to person, place, and time.      Cranial Nerves: Cranial nerves 2-12 are intact.   Psychiatric:         Attention and Perception: Attention normal.         Behavior: Behavior is cooperative.         Judgment: Judgment normal.       ECOG SCORE             Laboratory:  CBC with Differential:  Lab Results   Component Value Date    WBC 6.20 11/24/2023    RBC 3.49 (L) 11/24/2023    HGB 10.5 (L) 11/24/2023    HCT 34.2 (L) 11/24/2023    MCV 98.0 (H) 11/24/2023    MCH 30.1 11/24/2023    MCHC 30.7 (L) 11/24/2023    RDW 12.7 11/24/2023     11/24/2023    MPV 8.9 11/24/2023        CMP:  Sodium Level   Date Value Ref Range Status   11/24/2023 139 136 - 145 mmol/L Final     Potassium Level   Date Value Ref Range Status   11/24/2023 4.2 3.5 - 5.1 mmol/L Final     Carbon Dioxide   Date Value Ref Range Status   11/24/2023 27 23 - 31 mmol/L Final     Blood Urea Nitrogen   Date Value Ref Range Status   11/24/2023 34.7 (H) 9.8 - 20.1 mg/dL Final     Creatinine   Date Value Ref Range Status   11/24/2023 1.07 (H) 0.55 - 1.02 mg/dL Final     Calcium Level Total   Date Value Ref Range Status   11/24/2023 9.8 8.4 - 10.2 mg/dL Final     Albumin Level   Date Value Ref Range Status   11/24/2023 3.6 3.4 - 4.8 g/dL Final     Bilirubin Total   Date Value Ref Range Status   11/24/2023 0.3 <=1.5 mg/dL Final     Alkaline Phosphatase   Date Value Ref Range Status   11/24/2023 59 40 - 150 unit/L Final     Aspartate Aminotransferase   Date Value Ref Range  Status   11/24/2023 15 5 - 34 unit/L Final     Alanine Aminotransferase   Date Value Ref Range Status   11/24/2023 16 0 - 55 unit/L Final     Estimated GFR-Non    Date Value Ref Range Status   04/14/2022 46           Assessment:       1) T1bN0M0-Stage I left UOQ breast cancer (Dx 4/17)   --ER 97%, SC 90%. HER-2/jenny negative,    --Left lumpectomy (4/26/17)   --Left breast infiltrating ductal carcinoma arising from  DCIS   --0/1SLN+   --post lumpectomy XRT (8/14/17-9/13/17)- 5272cGy/21Fx   -- Femara ([id number])--> severe arthralgias   --Arimidex 3/2021-present  Osteoporosis and AI's:  Discussed with the  patient that aromatase inhibitors are well known to decrease endogenous estrogens and cause bone mass loss, osteopenia/osteoporosis, and increase the risk of pathological fractures, therfore, bone health is very important. She may need to be on medications for bone loss prevention/treatment. Bone health that includes calcium [id number] mg/day with vit D supplementation as well as weight bearing exercise to reduce the risk of pathological fractures. Discussed bone density tests, the significance of it and that it will be performed q 2 years or as indicated to evaluate her bone mass. Also discussed fall precautions. The patient was given ample opportunity to ask questions and they were all answered to her satisfaction.  Pt on Prolia.  Most recent bone density with osteopenia  2) Anemia--due to chronic disease  3) Comorbidities: CKD, DM, HTN, Hypothyroidism        Plan:       Patient is doing well and with no clinical evidence of disease.  We will continue present treatment.  Continue Arimidex  Continue Prolia every 6 months - due today  Mammogram due 4/2024 - ordered  Bone density due in 4/2025 - will order when closer  Continue Calcium + Vit D and weight bearing exercises  Keep follow ups with other physicians  RTC 6 months with labs prior (CBC, CMP, CA 27-29)- Prolia same day as visit      Encouraged  to call with questions or problems  The patient was given ample opportunity to ask questions and they were all answered to satisfaction; patient demonstrated understanding of what we discussed and is agreeable to the plan.    ALEXANDRIA MontillaP

## 2024-02-08 DIAGNOSIS — C50.919 MALIGNANT NEOPLASM OF BREAST IN FEMALE, ESTROGEN RECEPTOR POSITIVE, UNSPECIFIED LATERALITY, UNSPECIFIED SITE OF BREAST: ICD-10-CM

## 2024-02-08 DIAGNOSIS — Z17.0 MALIGNANT NEOPLASM OF BREAST IN FEMALE, ESTROGEN RECEPTOR POSITIVE, UNSPECIFIED LATERALITY, UNSPECIFIED SITE OF BREAST: ICD-10-CM

## 2024-02-08 RX ORDER — ANASTROZOLE 1 MG/1
TABLET ORAL
Qty: 30 TABLET | Refills: 3 | Status: SHIPPED | OUTPATIENT
Start: 2024-02-08

## 2024-04-18 ENCOUNTER — HOSPITAL ENCOUNTER (OUTPATIENT)
Dept: RADIOLOGY | Facility: HOSPITAL | Age: 74
Discharge: HOME | End: 2024-04-18
Attending: INTERNAL MEDICINE
Payer: MEDICARE

## 2024-04-18 DIAGNOSIS — Z17.0 MALIGNANT NEOPLASM OF UPPER-OUTER QUADRANT OF LEFT BREAST IN FEMALE, ESTROGEN RECEPTOR POSITIVE: ICD-10-CM

## 2024-04-18 DIAGNOSIS — C50.412 MALIGNANT NEOPLASM OF UPPER-OUTER QUADRANT OF LEFT BREAST IN FEMALE, ESTROGEN RECEPTOR POSITIVE: ICD-10-CM

## 2024-04-18 DIAGNOSIS — Z12.31 SCREENING MAMMOGRAM FOR HIGH-RISK PATIENT: ICD-10-CM

## 2024-04-18 PROCEDURE — 77063 BREAST TOMOSYNTHESIS BI: CPT | Mod: TC

## 2024-04-18 PROCEDURE — 77067 SCR MAMMO BI INCL CAD: CPT | Mod: TC

## 2024-04-18 PROCEDURE — 77067 SCR MAMMO BI INCL CAD: CPT | Mod: 26,,, | Performed by: RADIOLOGY

## 2024-04-18 PROCEDURE — 77063 BREAST TOMOSYNTHESIS BI: CPT | Mod: 26,,, | Performed by: RADIOLOGY

## 2024-05-24 ENCOUNTER — LAB VISIT (OUTPATIENT)
Dept: LAB | Facility: HOSPITAL | Age: 74
End: 2024-05-24
Attending: NURSE PRACTITIONER
Payer: MEDICARE

## 2024-05-24 DIAGNOSIS — M81.8 OTHER OSTEOPOROSIS WITHOUT CURRENT PATHOLOGICAL FRACTURE: ICD-10-CM

## 2024-05-24 DIAGNOSIS — Z79.811 AROMATASE INHIBITOR USE: ICD-10-CM

## 2024-05-24 DIAGNOSIS — C50.412 MALIGNANT NEOPLASM OF UPPER-OUTER QUADRANT OF LEFT BREAST IN FEMALE, ESTROGEN RECEPTOR POSITIVE: ICD-10-CM

## 2024-05-24 DIAGNOSIS — Z17.0 MALIGNANT NEOPLASM OF UPPER-OUTER QUADRANT OF LEFT BREAST IN FEMALE, ESTROGEN RECEPTOR POSITIVE: ICD-10-CM

## 2024-05-24 LAB
ALBUMIN SERPL-MCNC: 3.9 G/DL (ref 3.4–4.8)
ALBUMIN/GLOB SERPL: 1.2 RATIO (ref 1.1–2)
ALP SERPL-CCNC: 73 UNIT/L (ref 40–150)
ALT SERPL-CCNC: 14 UNIT/L (ref 0–55)
ANION GAP SERPL CALC-SCNC: 9 MEQ/L
AST SERPL-CCNC: 16 UNIT/L (ref 5–34)
BASOPHILS # BLD AUTO: 0.03 X10(3)/MCL
BASOPHILS NFR BLD AUTO: 0.5 %
BILIRUB SERPL-MCNC: 0.4 MG/DL
BUN SERPL-MCNC: 44.3 MG/DL (ref 9.8–20.1)
CALCIUM SERPL-MCNC: 10.6 MG/DL (ref 8.4–10.2)
CHLORIDE SERPL-SCNC: 104 MMOL/L (ref 98–107)
CO2 SERPL-SCNC: 26 MMOL/L (ref 23–31)
CREAT SERPL-MCNC: 1.41 MG/DL (ref 0.55–1.02)
CREAT/UREA NIT SERPL: 31
EOSINOPHIL # BLD AUTO: 0.22 X10(3)/MCL (ref 0–0.9)
EOSINOPHIL NFR BLD AUTO: 3.4 %
ERYTHROCYTE [DISTWIDTH] IN BLOOD BY AUTOMATED COUNT: 12.2 % (ref 11.5–17)
GFR SERPLBLD CREATININE-BSD FMLA CKD-EPI: 40 ML/MIN/1.73/M2
GLOBULIN SER-MCNC: 3.3 GM/DL (ref 2.4–3.5)
GLUCOSE SERPL-MCNC: 109 MG/DL (ref 82–115)
HCT VFR BLD AUTO: 34.7 % (ref 37–47)
HGB BLD-MCNC: 11.3 G/DL (ref 12–16)
IMM GRANULOCYTES # BLD AUTO: 0.01 X10(3)/MCL (ref 0–0.04)
IMM GRANULOCYTES NFR BLD AUTO: 0.2 %
LYMPHOCYTES # BLD AUTO: 1.94 X10(3)/MCL (ref 0.6–4.6)
LYMPHOCYTES NFR BLD AUTO: 29.8 %
MCH RBC QN AUTO: 31.1 PG (ref 27–31)
MCHC RBC AUTO-ENTMCNC: 32.6 G/DL (ref 33–36)
MCV RBC AUTO: 95.6 FL (ref 80–94)
MONOCYTES # BLD AUTO: 0.47 X10(3)/MCL (ref 0.1–1.3)
MONOCYTES NFR BLD AUTO: 7.2 %
NEUTROPHILS # BLD AUTO: 3.83 X10(3)/MCL (ref 2.1–9.2)
NEUTROPHILS NFR BLD AUTO: 58.9 %
PLATELET # BLD AUTO: 231 X10(3)/MCL (ref 130–400)
PMV BLD AUTO: 9.5 FL (ref 7.4–10.4)
POTASSIUM SERPL-SCNC: 4.7 MMOL/L (ref 3.5–5.1)
PROT SERPL-MCNC: 7.2 GM/DL (ref 5.8–7.6)
RBC # BLD AUTO: 3.63 X10(6)/MCL (ref 4.2–5.4)
SODIUM SERPL-SCNC: 139 MMOL/L (ref 136–145)
WBC # SPEC AUTO: 6.5 X10(3)/MCL (ref 4.5–11.5)

## 2024-05-24 PROCEDURE — 36415 COLL VENOUS BLD VENIPUNCTURE: CPT

## 2024-05-24 PROCEDURE — 85025 COMPLETE CBC W/AUTO DIFF WBC: CPT

## 2024-05-24 PROCEDURE — 80053 COMPREHEN METABOLIC PANEL: CPT

## 2024-05-24 PROCEDURE — 86300 IMMUNOASSAY TUMOR CA 15-3: CPT

## 2024-05-28 LAB — CANCER AG27-29 SERPL-ACNC: 16.9 U/ML

## 2024-06-06 ENCOUNTER — INFUSION (OUTPATIENT)
Dept: INFUSION THERAPY | Facility: HOSPITAL | Age: 74
End: 2024-06-06
Attending: INTERNAL MEDICINE
Payer: MEDICARE

## 2024-06-06 VITALS
TEMPERATURE: 97 F | SYSTOLIC BLOOD PRESSURE: 136 MMHG | DIASTOLIC BLOOD PRESSURE: 77 MMHG | OXYGEN SATURATION: 97 % | HEART RATE: 81 BPM

## 2024-06-06 DIAGNOSIS — M81.0 OSTEOPOROSIS, UNSPECIFIED OSTEOPOROSIS TYPE, UNSPECIFIED PATHOLOGICAL FRACTURE PRESENCE: Primary | ICD-10-CM

## 2024-06-06 PROCEDURE — 96372 THER/PROPH/DIAG INJ SC/IM: CPT

## 2024-06-06 PROCEDURE — 63600175 PHARM REV CODE 636 W HCPCS: Mod: JZ,JG | Performed by: NURSE PRACTITIONER

## 2024-06-06 RX ADMIN — DENOSUMAB 60 MG: 60 INJECTION SUBCUTANEOUS at 03:06

## 2024-06-07 ENCOUNTER — OFFICE VISIT (OUTPATIENT)
Dept: HEMATOLOGY/ONCOLOGY | Facility: CLINIC | Age: 74
End: 2024-06-07
Payer: MEDICARE

## 2024-06-07 DIAGNOSIS — C50.412 MALIGNANT NEOPLASM OF UPPER-OUTER QUADRANT OF LEFT BREAST IN FEMALE, ESTROGEN RECEPTOR POSITIVE: Primary | ICD-10-CM

## 2024-06-07 DIAGNOSIS — Z17.0 MALIGNANT NEOPLASM OF UPPER-OUTER QUADRANT OF LEFT BREAST IN FEMALE, ESTROGEN RECEPTOR POSITIVE: Primary | ICD-10-CM

## 2024-06-07 DIAGNOSIS — Z79.811 AROMATASE INHIBITOR USE: ICD-10-CM

## 2024-06-07 DIAGNOSIS — E66.01 MORBID (SEVERE) OBESITY DUE TO EXCESS CALORIES: ICD-10-CM

## 2024-06-07 PROCEDURE — 1126F AMNT PAIN NOTED NONE PRSNT: CPT | Mod: CPTII,95,, | Performed by: NURSE PRACTITIONER

## 2024-06-07 PROCEDURE — 99213 OFFICE O/P EST LOW 20 MIN: CPT | Mod: 95,,, | Performed by: NURSE PRACTITIONER

## 2024-06-07 PROCEDURE — 1101F PT FALLS ASSESS-DOCD LE1/YR: CPT | Mod: CPTII,95,, | Performed by: NURSE PRACTITIONER

## 2024-06-07 PROCEDURE — 4010F ACE/ARB THERAPY RXD/TAKEN: CPT | Mod: CPTII,95,, | Performed by: NURSE PRACTITIONER

## 2024-06-07 PROCEDURE — 3288F FALL RISK ASSESSMENT DOCD: CPT | Mod: CPTII,95,, | Performed by: NURSE PRACTITIONER

## 2024-06-07 NOTE — PROGRESS NOTES
HEMATOLOGY/ONCOLOGY OFFICE CLINIC VISIT  The patient location is: her home  The chief complaint leading to consultation is: 6 month f/u visit     Visit type: audiovisual    Face to Face time with patient: 12  20 minutes of total time spent on the encounter, which includes face to face time and non-face to face time preparing to see the patient (eg, review of tests), Obtaining and/or reviewing separately obtained history, Documenting clinical information in the electronic or other health record, Independently interpreting results (not separately reported) and communicating results to the patient/family/caregiver, or Care coordination (not separately reported).         Each patient to whom he or she provides medical services by telemedicine is:  (1) informed of the relationship between the physician and patient and the respective role of any other health care provider with respect to management of the patient; and (2) notified that he or she may decline to receive medical services by telemedicine and may withdraw from such care at any time.    Notes:     Visit Information:      Initial Consultation:5/15/17  Referring Physician:Dr Sam Lawson  Other Physicians:  Code Status:Not addressed     Diagnosis/Problem list:   T1bN0M0-Stage I left UOQ breast cancer (Dx 4/17), ER 97%, VT 90%. HER-2/jenny negative, DCIS component. 0/1SLN+    Present Treatment:  Arimidex 3/2021-present    Treatment history:    1) Left lumpectomy (4/26/17)  2) post lumpectomy XRT (8/14/17-9/13/17)- 5272cGy/21Fx  3) Femara ([id number])    Plan of care:  AI's  x > 5 yrs,        Imaging:    Bone Density 4/19/2023: Low bone mass.    Screening Mammogram with JERRY 4/19/2023: IMPRESSION: BENIGN  There is no mammographic evidence of malignancy.    RECOMMENDATIONS:   A routine screening mammogram in one year in the absence of significant clinical findings in the interval is recommended (April 2024).      Pathology:    4/26/17: [1]  LEFT BREAST, LUMPECTOMY:    A)  INVASIVE DUCTAL CARCINOMA, GRADE I (MODIFIED BLOOM-NOGUEIRA).  --  Greatest tumor dimension, 0.8 cm.  --  Tubule score:  2  --  Nuclear score: 1  --  Mitotic score:1 (2 mitoses / 10 HPFs).   --  Total Dylan score:  4/9 (Grade III).   --  Lymphovascular invasion, not identified.  --  Perineural invasion, not identified.  --  Surgical margins, uninvolved.   --  Closest margin, deep, within 2.7 mm from the invasive carcinoma.   --  Distance of invasive carcinoma from the lateral margin, 8 mm.   --  Distance of invasive carcinoma from the medial margin, 12.5 mm.     NOTE: The deep and encompassing margin was reexcised (specimen #3).  B)  DUCTAL CARCINOMA IN SITU, CRIBRIFORM AND SOLID, NUCLEAR GRADE 1.  --  Greatest dimension of DCIS, 0.2 cm.  --  Surgical margins, uninvolved.   --  Closest margin, medial, 3 mm from the DCIS.    NOTE: This focus is at least 1 cm away from the site of invasive carcinoma.    [2]  SENTINEL LYMPH NODE, LYMPHADENECTOMY:   ONE REACTIVE LYMPH NODE.  NO EVIDENCE OF NEOPLASM (0/1).  [3]  LEFT BREAST, RE-EXCISION OF THE DEEP AND ENCOMPASSING MARGIN:  A)  NO EVIDENCE OF INVASIVE CARCINOMA OR DCIS.   B)  FOCAL ATYPICAL DUCTAL HYPERPLASIA.   C)  FIBROCYSTIC CHANGE.     STAGE (pTNM) (Note M):           TNM Descriptors:  Not applicable         Primary Tumor (Invasive Carcinoma) (pT):  PT1b:  Tumor > 5 mm but <= 10 mm in greatest dimension         Regional Lymph Nodes (pN) (choose a category based on lymph nodes received with the specimen;  immunohistochemistry and / or molecular studies are not required):                 Category (pN):  PN0: No regional lymph node metastasis identified histologically         Distant Metastasis (pM):  Not applicable   ADDITIONAL NON-TUMOR:  Additional Pathologic Findings:  Atypical ductal hyperplasia, Fibrocystic change.       4/3/17: LEFT BREAST LESION, UPPER OUTER QUADRANT: INVASIVE DUCTAL CARCINOMA, BLOOM NOGUEIRA GRADE 1.  DUCTAL CARCINOMA IN SITU,  LOW GRADE SOLID TYPE.   COMMENT:  DCIS is present on all four biopsies, the largest focus measuring 0.7 cm.  Invasive carcinoma is present on three of the core biopsies, the largest focus measuring 0.5 cm. The results of ER, [place] and Her2 jenny analysis will be the subject of an addendum report.  This case was reviewed in intradepartmental consultation.       Breast Tumor Prognostic Profile    TEST DESCRIPTION RESULT  INTERPRETATION     Estrogen Receptor    97.36 %   Positive     Progesterone Receptor    90.13 %   Positive     Qbh3kol/c-erb-2 oncoprotein   1+   Negative        CLINICAL HISTORY:       Patient:Ms Law is a pleasant lady kindly referred by Dr. Lawson, diagnosed with breast cancer when she was evaluated for an abnormal mammogram. On 2/8/17 patient underwent routine screening mammogram that was read first BI-RADS Category 2: Benign but Upon further review, asymmetry within the upper outer left breast posteriorly most conspicuous on the exaggerated CC lateral and no prior exaggerated CC lateral available for comparison to determine stability. It was changed to Cat 0: Further evaluation with spot compression views recommended. On 3/20/17 further views showed Suspicious 9 mm spiculated mass in the left upper outer quadrant. Nodular densities in the 6-7 o'clock left breast and left upper outer quadrant. BIRADS Category 0.  Incomplete.  RECOMMENDATION:  Needs further imaging evaluation.  Targeted left breast ultrasound is recommended for further evaluation. Given the appearance of the spiculated mass in the left breast, biopsy will likely be necessary.       On 4/3/17: left breast u/s showed an 8mm irregular hypoechoic lesion in the 2:00 left breast, correlating with the spiculated mass seen in the left upper outer quadrant on recent diagnostic mammogram. BIRADS Category 4: Suspicious. RECOMMENDATION: Ultrasound guided core needle biopsy of the suspicious mass in the 2:00 left breast is recommended. It was  performed same day by Dr Eros Blank. pathology report LEFT BREAST LESION, UPPER OUTER QUADRANT: INVASIVE DUCTAL CARCINOMA, BLOOM NOGUEIRA GRADE 1. DUCTAL CARCINOMA IN SITU, LOW GRADE SOLID TYPE.  DCIS is present on all four biopsies, the largest focus measuring 0.7 cm.  Invasive carcinoma is present on three of the core biopsies, the largest focus measuring 0.5 cm.    She underwent left breast ultrasound-guided lumpectomy with left axillary sentinel node biopsy on 4/26/17 performed by Dr. Sam Lawson. Pathology showed INVASIVE DUCTAL CARCINOMA, GRADE I (MODIFIED BLOOM-NOGUEIRA).  Greatest tumor dimension, 0.8 cm. Lymphovascular invasion, not identified.  Perineural invasion, not identified. Surgical margins, uninvolved. DUCTAL CARCINOMA IN SITU, CRIBRIFORM AND SOLID, NUCLEAR GRADE 1.  Greatest dimension of DCIS, 0.2 cm.  SENTINEL LYMPH NODE, LYMPHADENECTOMY: ONE REACTIVE LYMPH NODE.  NO EVIDENCE OF NEOPLASM (0/1). FOCAL ATYPICAL DUCTAL HYPERPLASIA.     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 She have a trip coming soon to [place]. She is leaving early June.    Patient is feeling well today and does not have any complaints at all. She denies any fever, chills, sweats. No chest pain or shortness of breath. No changes in bowel habits. No bleeding.     Completed postlumpectomy XRT 9/17     Patient reports that her anemia has been extensively workup by Dr. Saavedra in [place] in the past and she was diasgnosed with anemia of chronic disease and she does  not want workup for anemia.     Left breast mammogram/US 2/13/2020  IMPRESSION: SUSPICIOUS OF MALIGNANCY  Oval mass in the 2:00 left breast is suspicious.  OVERALL STUDY BIRADS: 4 Suspicious for malignancy    Subsequent biopsy on 3/6/20 was NEGATIVE for malignancy.        Bone density on 4/19/23 shows low bone mass but osteopenia now.        Chief Complaint: 6 Month Follow Up (Virtual Visit. )      Interval History:    Patient presents  today via telemedicine for 6 month f/u. She is taking Arimidex and tolerating well. Last screening vasile bilateral breast on 4/18/2024: was BIRADS 2 benign.  She is on Prolia every 6 months - she received it yesterday. She denies any changes in self breast exams. She completed PT for balance training associated with vertigo and now she continues with PT 3 days per weeks for wellness program.         ROS: All 14 points ROS taken and positive as per Interval History, all other negative.  Review of Systems   Constitutional:  Negative for chills, fever and weight loss.   HENT:  Negative for congestion and nosebleeds.    Eyes:  Negative for blurred vision, double vision and photophobia.   Respiratory:  Negative for cough, hemoptysis and shortness of breath.    Cardiovascular:  Negative for chest pain, palpitations, leg swelling and PND.   Gastrointestinal:  Negative for abdominal pain, blood in stool, constipation, diarrhea, melena, nausea and vomiting.   Genitourinary:  Negative for dysuria, frequency, hematuria and urgency.   Musculoskeletal:  Positive for joint pain. Negative for back pain, falls and myalgias.   Skin:  Negative for itching and rash.   Neurological:  Positive for dizziness. Negative for tremors, focal weakness, seizures, weakness and headaches.   Endo/Heme/Allergies:  Negative for environmental allergies. Does not bruise/bleed easily.   Psychiatric/Behavioral:  Negative for depression and suicidal ideas. The patient is not nervous/anxious.          Histories:  PMH/PSH/FH/SOCIAL/ALLERGIES AND MEDS REVIEWED AND UPDATED AS APPROPRIATE       There were no vitals filed for this visit.       Wt Readings from Last 6 Encounters:   11/29/23 99 kg (218 lb 3.2 oz)   05/08/23 99.8 kg (220 lb)   04/26/23 100.7 kg (222 lb 1.6 oz)   01/19/23 103.4 kg (228 lb)   10/25/22 102.5 kg (226 lb)   05/04/22 99.8 kg (220 lb)     There is no height or weight on file to calculate BMI.  There is no height or weight on file to  calculate BSA.  Physical Exam  Constitutional:       Appearance: Normal appearance.   Pulmonary:      Effort: Pulmonary effort is normal.   Neurological:      Mental Status: She is oriented to person, place, and time.   Psychiatric:         Mood and Affect: Mood normal.         Behavior: Behavior normal.       ECOG SCORE    1 - Restricted in strenuous activity-ambulatory and able to carry out work of a light nature         Laboratory:  CBC with Differential:  Lab Results   Component Value Date    WBC 6.50 05/24/2024    RBC 3.63 (L) 05/24/2024    HGB 11.3 (L) 05/24/2024    HCT 34.7 (L) 05/24/2024    MCV 95.6 (H) 05/24/2024    MCH 31.1 (H) 05/24/2024    MCHC 32.6 (L) 05/24/2024    RDW 12.2 05/24/2024     05/24/2024    MPV 9.5 05/24/2024        CMP:  Sodium   Date Value Ref Range Status   05/24/2024 139 136 - 145 mmol/L Final     Potassium   Date Value Ref Range Status   05/24/2024 4.7 3.5 - 5.1 mmol/L Final     Chloride   Date Value Ref Range Status   05/24/2024 104 98 - 107 mmol/L Final     CO2   Date Value Ref Range Status   05/24/2024 26 23 - 31 mmol/L Final     Blood Urea Nitrogen   Date Value Ref Range Status   05/24/2024 44.3 (H) 9.8 - 20.1 mg/dL Final     Creatinine   Date Value Ref Range Status   05/24/2024 1.41 (H) 0.55 - 1.02 mg/dL Final     Calcium   Date Value Ref Range Status   05/24/2024 10.6 (H) 8.4 - 10.2 mg/dL Final     Albumin   Date Value Ref Range Status   05/24/2024 3.9 3.4 - 4.8 g/dL Final     Bilirubin Total   Date Value Ref Range Status   05/24/2024 0.4 <=1.5 mg/dL Final     ALP   Date Value Ref Range Status   05/24/2024 73 40 - 150 unit/L Final     AST   Date Value Ref Range Status   05/24/2024 16 5 - 34 unit/L Final     ALT   Date Value Ref Range Status   05/24/2024 14 0 - 55 unit/L Final     Estimated GFR-Non    Date Value Ref Range Status   04/14/2022 46           Assessment:       1) T1bN0M0-Stage I left UOQ breast cancer (Dx 4/17)   --ER 97%, CA 90%. HER-2/jenny  negative,    --Left lumpectomy (4/26/17)   --Left breast infiltrating ductal carcinoma arising from  DCIS   --0/1SLN+   --post lumpectomy XRT (8/14/17-9/13/17)- 5272cGy/21Fx   -- Femara ([id number])--> severe arthralgias   --Arimidex 3/2021-present  Osteoporosis and AI's:  Discussed with the  patient that aromatase inhibitors are well known to decrease endogenous estrogens and cause bone mass loss, osteopenia/osteoporosis, and increase the risk of pathological fractures, therfore, bone health is very important. She may need to be on medications for bone loss prevention/treatment. Bone health that includes calcium 5600-5266 mg/day with vit D supplementation as well as weight bearing exercise to reduce the risk of pathological fractures. Discussed bone density tests, the significance of it and that it will be performed q 2 years or as indicated to evaluate her bone mass. Also discussed fall precautions. The patient was given ample opportunity to ask questions and they were all answered to her satisfaction.  Pt on Prolia.  Most recent bone density with osteopenia  2) Anemia--due to chronic disease  3) Comorbidities: CKD, DM, HTN, Hypothyroidism        Plan:       Patient is doing well and with no mammographic evidence of disease.  We will continue present treatment.  Continue Arimidex  Continue Prolia every 6 months - done yesterday  Mammogram due 4/2025 - order at next visit  Bone density due in 4/2025 - order at next visit  Continue Calcium + Vit D and weight bearing exercises- decrease calcium supplements for hypercalcemia.   Keep follow ups with other physicians  RTC 6 months with labs prior (CBC, CMP, CA 27-29)- Prolia same day as visit      Encouraged to call with questions or problems  The patient was given ample opportunity to ask questions and they were all answered to satisfaction; patient demonstrated understanding of what we discussed and is agreeable to the plan.    REMY Montilla

## 2024-06-13 ENCOUNTER — ANESTHESIA (OUTPATIENT)
Dept: ENDOSCOPY | Facility: HOSPITAL | Age: 74
End: 2024-06-13
Payer: MEDICARE

## 2024-06-13 ENCOUNTER — ANESTHESIA EVENT (OUTPATIENT)
Dept: ENDOSCOPY | Facility: HOSPITAL | Age: 74
End: 2024-06-13
Payer: MEDICARE

## 2024-06-13 ENCOUNTER — HOSPITAL ENCOUNTER (OUTPATIENT)
Facility: HOSPITAL | Age: 74
Discharge: HOME | End: 2024-06-13
Attending: INTERNAL MEDICINE | Admitting: INTERNAL MEDICINE
Payer: MEDICARE

## 2024-06-13 VITALS
HEIGHT: 65 IN | RESPIRATION RATE: 18 BRPM | BODY MASS INDEX: 36.99 KG/M2 | HEART RATE: 77 BPM | OXYGEN SATURATION: 100 % | SYSTOLIC BLOOD PRESSURE: 120 MMHG | TEMPERATURE: 97 F | WEIGHT: 222 LBS | DIASTOLIC BLOOD PRESSURE: 46 MMHG

## 2024-06-13 PROCEDURE — D9220A PRA ANESTHESIA: Mod: CRNA,,, | Performed by: NURSE ANESTHETIST, CERTIFIED REGISTERED

## 2024-06-13 PROCEDURE — D9220A PRA ANESTHESIA: Mod: ANES,,, | Performed by: STUDENT IN AN ORGANIZED HEALTH CARE EDUCATION/TRAINING PROGRAM

## 2024-06-13 PROCEDURE — G0105 COLORECTAL SCRN; HI RISK IND: HCPCS | Performed by: INTERNAL MEDICINE

## 2024-06-13 PROCEDURE — 25000003 PHARM REV CODE 250: Performed by: NURSE ANESTHETIST, CERTIFIED REGISTERED

## 2024-06-13 PROCEDURE — 25000003 PHARM REV CODE 250: Performed by: STUDENT IN AN ORGANIZED HEALTH CARE EDUCATION/TRAINING PROGRAM

## 2024-06-13 PROCEDURE — 37000008 HC ANESTHESIA 1ST 15 MINUTES: Performed by: INTERNAL MEDICINE

## 2024-06-13 PROCEDURE — 37000009 HC ANESTHESIA EA ADD 15 MINS: Performed by: INTERNAL MEDICINE

## 2024-06-13 RX ORDER — PHENYLEPHRINE HCL IN 0.9% NACL 1 MG/10 ML
SYRINGE (ML) INTRAVENOUS
Status: DISCONTINUED
Start: 2024-06-13 | End: 2024-06-13 | Stop reason: HOSPADM

## 2024-06-13 RX ORDER — LIDOCAINE HYDROCHLORIDE 20 MG/ML
INJECTION, SOLUTION EPIDURAL; INFILTRATION; INTRACAUDAL; PERINEURAL
Status: DISCONTINUED
Start: 2024-06-13 | End: 2024-06-13 | Stop reason: HOSPADM

## 2024-06-13 RX ORDER — PHENYLEPHRINE HCL IN 0.9% NACL 1 MG/10 ML
SYRINGE (ML) INTRAVENOUS
Status: DISCONTINUED | OUTPATIENT
Start: 2024-06-13 | End: 2024-06-13

## 2024-06-13 RX ORDER — SODIUM CHLORIDE 9 MG/ML
INJECTION, SOLUTION INTRAVENOUS CONTINUOUS
Status: DISCONTINUED | OUTPATIENT
Start: 2024-06-13 | End: 2024-06-13 | Stop reason: HOSPADM

## 2024-06-13 RX ORDER — SODIUM CHLORIDE, SODIUM GLUCONATE, SODIUM ACETATE, POTASSIUM CHLORIDE AND MAGNESIUM CHLORIDE 30; 37; 368; 526; 502 MG/100ML; MG/100ML; MG/100ML; MG/100ML; MG/100ML
INJECTION, SOLUTION INTRAVENOUS CONTINUOUS
Status: DISCONTINUED | OUTPATIENT
Start: 2024-06-13 | End: 2024-06-13 | Stop reason: HOSPADM

## 2024-06-13 RX ORDER — PROPOFOL 10 MG/ML
VIAL (ML) INTRAVENOUS
Status: COMPLETED
Start: 2024-06-13 | End: 2024-06-13

## 2024-06-13 RX ORDER — LIDOCAINE HYDROCHLORIDE 20 MG/ML
INJECTION INTRAVENOUS
Status: DISCONTINUED | OUTPATIENT
Start: 2024-06-13 | End: 2024-06-13

## 2024-06-13 RX ORDER — PROPOFOL 10 MG/ML
VIAL (ML) INTRAVENOUS
Status: DISCONTINUED
Start: 2024-06-13 | End: 2024-06-13 | Stop reason: HOSPADM

## 2024-06-13 RX ORDER — PROPOFOL 10 MG/ML
INJECTION, EMULSION INTRAVENOUS CONTINUOUS PRN
Status: DISCONTINUED | OUTPATIENT
Start: 2024-06-13 | End: 2024-06-13

## 2024-06-13 RX ADMIN — SODIUM CHLORIDE: 9 INJECTION, SOLUTION INTRAVENOUS at 08:06

## 2024-06-13 RX ADMIN — PROPOFOL 150 MCG/KG/MIN: 10 INJECTION, EMULSION INTRAVENOUS at 08:06

## 2024-06-13 RX ADMIN — Medication 100 MCG: at 08:06

## 2024-06-13 RX ADMIN — LIDOCAINE HYDROCHLORIDE 40 MG: 20 INJECTION INTRAVENOUS at 08:06

## 2024-06-13 NOTE — ANESTHESIA POSTPROCEDURE EVALUATION
Anesthesia Post Evaluation    Patient: Erma Law    Procedure(s) Performed: Procedure(s) (LRB):  COLON (N/A)    Final Anesthesia Type: general      Patient location during evaluation: PACU  Patient participation: Yes- Able to Participate  Level of consciousness: awake and alert  Post-procedure vital signs: reviewed and stable  Pain management: adequate  Airway patency: patent    PONV status at discharge: No PONV  Anesthetic complications: no      Respiratory status: unassisted  Hydration status: euvolemic  Follow-up not needed.              Vitals Value Taken Time   /46 06/13/24 0912   Temp nl 06/13/24 0935   Pulse 77 06/13/24 0912   Resp 18 06/13/24 0912   SpO2 100 % 06/13/24 0912         No case tracking events are documented in the log.      Pain/Pallavi Score: Pallavi Score: 10 (6/13/2024  9:12 AM)

## 2024-06-13 NOTE — TRANSFER OF CARE
"Anesthesia Transfer of Care Note    Patient: Erma Law    Procedure(s) Performed: Procedure(s) (LRB):  COLON (N/A)    Patient location: GI    Anesthesia Type: MAC    Transport from OR: Transported from OR on room air with adequate spontaneous ventilation    Post assessment: no apparent anesthetic complications    Post vital signs: stable    Level of consciousness: responds to stimulation    Nausea/Vomiting: no nausea/vomiting    Complications: none    Transfer of care protocol was followed      Last vitals: Visit Vitals  BP (!) 181/79 (BP Location: Right leg, Patient Position: Lying)   Pulse 97   Temp 35.9 °C (96.6 °F) (Tympanic)   Resp 20   Ht 5' 5" (1.651 m)   Wt 100.7 kg (222 lb)   SpO2 98%   BMI 36.94 kg/m²     "

## 2024-06-13 NOTE — ANESTHESIA PREPROCEDURE EVALUATION
06/13/2024  Erma Law is a 73 y.o., female.      Pre-op Assessment    I have reviewed the Patient Summary Reports.     I have reviewed the Nursing Notes. I have reviewed the NPO Status.   I have reviewed the Medications.     Review of Systems  Anesthesia Hx:               Denies Personal Hx of Anesthesia complications.                    Hematology/Oncology:                        --  Cancer in past history:       Breast              Cardiovascular:     Hypertension                                        Pulmonary:  Pulmonary Normal                       Hepatic/GI:  Hepatic/GI Normal                 Neurological:  Neurology Normal                                      Endocrine:  Diabetes         Obesity / BMI > 30      Physical Exam  General: Well nourished, Cooperative and Alert    Airway:  Mallampati: II   Mouth Opening: Normal  TM Distance: Normal    Dental:  Intact    Chest/Lungs:  Normal Respiratory Rate        Anesthesia Plan  Type of Anesthesia, risks & benefits discussed:    Anesthesia Type: Gen Natural Airway  Intra-op Monitoring Plan: Standard ASA Monitors  Post Op Pain Control Plan: IV/PO Opioids PRN  Induction:  IV  Informed Consent: Informed consent signed with the Patient and all parties understand the risks and agree with anesthesia plan.  All questions answered.   ASA Score: 2  Day of Surgery Review of History & Physical: H&P Update referred to the surgeon/provider.    Ready For Surgery From Anesthesia Perspective.     .

## 2024-06-13 NOTE — PROVATION PATIENT INSTRUCTIONS
Discharge Summary/Instructions after an Endoscopic Procedure  Patient Name: Erma Law  Patient MRN: [id number]  Patient YOB: 1950 Thursday, June 13, 2024  Evans Trotter MD  Dear patient,  As a result of recent federal legislation (The Federal Cures Act), you may   receive lab or pathology results from your procedure in your [facility]   account before your physician is able to contact you. Your physician or   their representative will relay the results to you with their   recommendations at their soonest availability.  Thank you,  RESTRICTIONS:  During your procedure today, you received medications for sedation.  These   medications may affect your judgment, balance and coordination.  Therefore,   for 24 hours, you have the following restrictions:   - DO NOT drive a car, operate machinery, make legal/financial decisions,   sign important papers or drink alcohol.    ACTIVITY:  Today: no heavy lifting, straining or running due to procedural   sedation/anesthesia.  The following day: return to full activity including work.  DIET:  Eat and drink normally unless instructed otherwise.     TREATMENT FOR COMMON SIDE EFFECTS:  - Mild abdominal pain, nausea, belching, bloating or excessive gas:  rest,   eat lightly and use a heating pad.  - Sore Throat: treat with throat lozenges and/or gargle with warm salt   water.  - Because air was used during the procedure, expelling large amounts of air   from your rectum or belching is normal.  - If a bowel prep was taken, you may not have a bowel movement for 1-3 days.    This is normal.  SYMPTOMS TO WATCH FOR AND REPORT TO YOUR PHYSICIAN:  1. Abdominal pain or bloating, other than gas cramps.  2. Chest pain.  3. Back pain.  4. Signs of infection such as: chills or fever occurring within 24 hours   after the procedure.  5. Rectal bleeding, which would show as bright red, maroon, or black stools.   (A tablespoon of blood from the rectum is not serious, especially  if   hemorrhoids are present.)  6. Vomiting.  7. Weakness or dizziness.  GO DIRECTLY TO THE NEAREST EMERGENCY ROOM IF YOU HAVE ANY OF THE FOLLOWING:      Difficulty breathing              Chills and/or fever over 101 F   Persistent vomiting and/or vomiting blood   Severe abdominal pain   Severe chest pain   Black, tarry stools   Bleeding- more than one tablespoon   Any other symptom or condition that you feel may need urgent attention  Your doctor recommends these additional instructions:  If any biopsies were taken, your doctors clinic will contact you in 1 to 2   weeks with any results.  - Patient has a contact number available for emergencies.  The signs and   symptoms of potential delayed complications were discussed with the   patient.  Return to normal activities tomorrow.  Written discharge   instructions were provided to the patient.   - Discharge patient to home (with escort).   - Advance diet as tolerated today.   - Repeat colonoscopy in 5 years for surveillance.   - The findings and recommendations were discussed with the designated   responsible adult.  For questions, problems or results please call your physician - Evans Trotter MD at Work:  [phone].  [facility] [place], EMERGENCY ROOM PHONE NUMBER: [phone]  IF A COMPLICATION OR EMERGENCY SITUATION ARISES AND YOU ARE UNABLE TO REACH   YOUR PHYSICIAN - GO DIRECTLY TO THE EMERGENCY ROOM.  MD Evans Swanson MD  6/13/2024 9:01:42 AM  This report has been verified and signed electronically.  Dear patient,  As a result of recent federal legislation (The Federal Cures Act), you may   receive lab or pathology results from your procedure in your [facility]   account before your physician is able to contact you. Your physician or   their representative will relay the results to you with their   recommendations at their soonest availability.  Thank you,  PROVATION

## 2024-06-13 NOTE — H&P
[facility] Endoscopy  Gastroenterology  H&P    Patient Name: Erma Law  MRN: [id number]  Admission Date: 6/13/2024  Code Status: No Order    Attending Provider: Evans Trotter MD   Primary Care Physician: Bel Griffin NP  Principal Problem:<principal problem not specified>    Subjective:     History of Present Illness:  73 year old female here for outpatient colonoscopy, personal history of colon polyps.     Past Medical History:   Diagnosis Date    Breast cancer     DM (diabetes mellitus)     High cholesterol     HTN (hypertension)        Past Surgical History:   Procedure Laterality Date    LEFT BREAST LUMPECTOMY      WITH SENTINAL NODE BIOPSY    TONSILLECTOMY      TUBAL LIGATION         Review of patient's allergies indicates:   Allergen Reactions    Levofloxacin     Codeine Nausea And Vomiting     Family History       Problem Relation (Age of Onset)    Breast cancer Maternal Aunt          Tobacco Use    Smoking status: Never    Smokeless tobacco: Never   Substance and Sexual Activity    Alcohol use: Never    Drug use: Never    Sexual activity: Not on file     Review of Systems   Constitutional:  Negative for chills and fever.   HENT:  Negative for mouth sores.    Eyes:  Negative for visual disturbance.   Respiratory:  Negative for cough and shortness of breath.    Cardiovascular:  Negative for chest pain.   Gastrointestinal:  Negative for abdominal pain, constipation, diarrhea, nausea and vomiting.   Endocrine: Negative for cold intolerance and heat intolerance.   Genitourinary:  Negative for frequency and urgency.   Musculoskeletal:  Negative for back pain.   Skin:  Negative for rash.   Neurological:  Negative for headaches.   Psychiatric/Behavioral:  Negative for agitation and behavioral problems.      Objective:     Vital Signs (Most Recent):  Temp: 96.6 °F (35.9 °C) (06/13/24 0758)  Pulse: 97 (06/13/24 0758)  Resp: 20 (06/13/24 0758)  BP: (!) 181/79 (06/13/24 0758)  SpO2:  98 % (06/13/24 0758) Vital Signs (24h Range):  Temp:  [96.6 °F (35.9 °C)] 96.6 °F (35.9 °C)  Pulse:  [97] 97  Resp:  [20] 20  SpO2:  [98 %] 98 %  BP: (181)/(79) 181/79     Weight: 100.7 kg (222 lb) (06/13/24 0758)  Body mass index is 36.94 kg/m².    No intake or output data in the 24 hours ending 06/13/24 0855    Lines/Drains/Airways       Peripheral Intravenous Line  Duration                  Peripheral IV - Single Lumen 06/13/24 0757 22 G Posterior;Right Hand <1 day                    Physical Exam  Constitutional:       General: She is not in acute distress.     Appearance: She is well-developed. She is not diaphoretic.   HENT:      Head: Normocephalic and atraumatic.   Eyes:      General: No scleral icterus.     Conjunctiva/sclera: Conjunctivae normal.   Neck:      Thyroid: No thyromegaly.   Cardiovascular:      Rate and Rhythm: Normal rate and regular rhythm.      Heart sounds: No murmur heard.     No friction rub.   Pulmonary:      Effort: Pulmonary effort is normal. No respiratory distress.      Breath sounds: Normal breath sounds. No wheezing.   Abdominal:      General: Bowel sounds are normal. There is no distension.      Palpations: Abdomen is soft. There is no mass.      Tenderness: There is no abdominal tenderness. There is no guarding or rebound.      Hernia: No hernia is present.   Musculoskeletal:         General: Normal range of motion.      Cervical back: Normal range of motion and neck supple.   Skin:     General: Skin is warm and dry.   Neurological:      Mental Status: She is alert and oriented to person, place, and time.   Psychiatric:         Behavior: Behavior normal.         Significant Labs:  All pertinent lab results from the last 24 hours have been reviewed.    Significant Imaging:  Imaging results within the past 24 hours have been reviewed.    Assessment/Plan:     There are no hospital problems to display for this patient.    Procedure risks and benefits were discussed in detail today  and an opportunity to answer patient/family questions was provided.        Evans Trotter MD  Gastroenterology  [facility]

## 2024-06-27 DIAGNOSIS — Z17.0 MALIGNANT NEOPLASM OF BREAST IN FEMALE, ESTROGEN RECEPTOR POSITIVE, UNSPECIFIED LATERALITY, UNSPECIFIED SITE OF BREAST: ICD-10-CM

## 2024-06-27 DIAGNOSIS — C50.919 MALIGNANT NEOPLASM OF BREAST IN FEMALE, ESTROGEN RECEPTOR POSITIVE, UNSPECIFIED LATERALITY, UNSPECIFIED SITE OF BREAST: ICD-10-CM

## 2024-06-27 RX ORDER — ANASTROZOLE 1 MG/1
TABLET ORAL
Qty: 30 TABLET | Refills: 5 | Status: SHIPPED | OUTPATIENT
Start: 2024-06-27

## 2024-11-26 ENCOUNTER — LAB VISIT (OUTPATIENT)
Dept: LAB | Facility: HOSPITAL | Age: 74
End: 2024-11-26
Attending: INTERNAL MEDICINE
Payer: MEDICARE

## 2024-11-26 DIAGNOSIS — C50.412 MALIGNANT NEOPLASM OF UPPER-OUTER QUADRANT OF LEFT BREAST IN FEMALE, ESTROGEN RECEPTOR POSITIVE: ICD-10-CM

## 2024-11-26 DIAGNOSIS — Z79.811 AROMATASE INHIBITOR USE: ICD-10-CM

## 2024-11-26 DIAGNOSIS — Z17.0 MALIGNANT NEOPLASM OF UPPER-OUTER QUADRANT OF LEFT BREAST IN FEMALE, ESTROGEN RECEPTOR POSITIVE: ICD-10-CM

## 2024-11-26 LAB
ALBUMIN SERPL-MCNC: 3.8 G/DL (ref 3.4–4.8)
ALBUMIN/GLOB SERPL: 1.1 RATIO (ref 1.1–2)
ALP SERPL-CCNC: 74 UNIT/L (ref 40–150)
ALT SERPL-CCNC: 16 UNIT/L (ref 0–55)
ANION GAP SERPL CALC-SCNC: 9 MEQ/L
AST SERPL-CCNC: 18 UNIT/L (ref 5–34)
BASOPHILS # BLD AUTO: 0.03 X10(3)/MCL
BASOPHILS NFR BLD AUTO: 0.4 %
BILIRUB SERPL-MCNC: 0.2 MG/DL
BUN SERPL-MCNC: 41.7 MG/DL (ref 9.8–20.1)
CALCIUM SERPL-MCNC: 10.1 MG/DL (ref 8.4–10.2)
CHLORIDE SERPL-SCNC: 105 MMOL/L (ref 98–107)
CO2 SERPL-SCNC: 25 MMOL/L (ref 23–31)
CREAT SERPL-MCNC: 1.45 MG/DL (ref 0.55–1.02)
CREAT/UREA NIT SERPL: 29
EOSINOPHIL # BLD AUTO: 0.15 X10(3)/MCL (ref 0–0.9)
EOSINOPHIL NFR BLD AUTO: 2 %
ERYTHROCYTE [DISTWIDTH] IN BLOOD BY AUTOMATED COUNT: 12.5 % (ref 11.5–17)
GFR SERPLBLD CREATININE-BSD FMLA CKD-EPI: 38 ML/MIN/1.73/M2
GLOBULIN SER-MCNC: 3.4 GM/DL (ref 2.4–3.5)
GLUCOSE SERPL-MCNC: 203 MG/DL (ref 82–115)
HCT VFR BLD AUTO: 33.4 % (ref 37–47)
HGB BLD-MCNC: 11 G/DL (ref 12–16)
IMM GRANULOCYTES # BLD AUTO: 0.01 X10(3)/MCL (ref 0–0.04)
IMM GRANULOCYTES NFR BLD AUTO: 0.1 %
LYMPHOCYTES # BLD AUTO: 1.78 X10(3)/MCL (ref 0.6–4.6)
LYMPHOCYTES NFR BLD AUTO: 24.2 %
MCH RBC QN AUTO: 31.3 PG (ref 27–31)
MCHC RBC AUTO-ENTMCNC: 32.9 G/DL (ref 33–36)
MCV RBC AUTO: 94.9 FL (ref 80–94)
MONOCYTES # BLD AUTO: 0.43 X10(3)/MCL (ref 0.1–1.3)
MONOCYTES NFR BLD AUTO: 5.8 %
NEUTROPHILS # BLD AUTO: 4.96 X10(3)/MCL (ref 2.1–9.2)
NEUTROPHILS NFR BLD AUTO: 67.5 %
PLATELET # BLD AUTO: 244 X10(3)/MCL (ref 130–400)
PMV BLD AUTO: 9.5 FL (ref 7.4–10.4)
POTASSIUM SERPL-SCNC: 4.4 MMOL/L (ref 3.5–5.1)
PROT SERPL-MCNC: 7.2 GM/DL (ref 5.8–7.6)
RBC # BLD AUTO: 3.52 X10(6)/MCL (ref 4.2–5.4)
SODIUM SERPL-SCNC: 139 MMOL/L (ref 136–145)
WBC # BLD AUTO: 7.36 X10(3)/MCL (ref 4.5–11.5)

## 2024-11-26 PROCEDURE — 80053 COMPREHEN METABOLIC PANEL: CPT

## 2024-11-26 PROCEDURE — 36415 COLL VENOUS BLD VENIPUNCTURE: CPT

## 2024-11-26 PROCEDURE — 85025 COMPLETE CBC W/AUTO DIFF WBC: CPT

## 2024-11-26 PROCEDURE — 86300 IMMUNOASSAY TUMOR CA 15-3: CPT

## 2024-11-29 LAB — CANCER AG27-29 SERPL-ACNC: 17.5 U/ML

## 2024-12-10 ENCOUNTER — INFUSION (OUTPATIENT)
Dept: INFUSION THERAPY | Facility: HOSPITAL | Age: 74
End: 2024-12-10
Attending: INTERNAL MEDICINE
Payer: MEDICARE

## 2024-12-10 VITALS
OXYGEN SATURATION: 98 % | HEART RATE: 90 BPM | DIASTOLIC BLOOD PRESSURE: 75 MMHG | TEMPERATURE: 98 F | RESPIRATION RATE: 18 BRPM | SYSTOLIC BLOOD PRESSURE: 142 MMHG

## 2024-12-10 DIAGNOSIS — M81.0 OSTEOPOROSIS, UNSPECIFIED OSTEOPOROSIS TYPE, UNSPECIFIED PATHOLOGICAL FRACTURE PRESENCE: Primary | ICD-10-CM

## 2024-12-10 PROCEDURE — 63600175 PHARM REV CODE 636 W HCPCS: Mod: JZ,JG | Performed by: NURSE PRACTITIONER

## 2024-12-10 PROCEDURE — 96372 THER/PROPH/DIAG INJ SC/IM: CPT

## 2024-12-10 RX ADMIN — DENOSUMAB 60 MG: 60 INJECTION SUBCUTANEOUS at 02:12

## 2024-12-10 NOTE — PLAN OF CARE
Prolia injection given (Q6M); tolerated well; pt wanted to wait until next appointment on 12/20/24 with NP to schedule the next prolia + NP appointment follow-ups within the same day; pt educated to F/U with clinic scheduler; discharged home in stable condition.

## 2024-12-20 ENCOUNTER — OFFICE VISIT (OUTPATIENT)
Dept: HEMATOLOGY/ONCOLOGY | Facility: CLINIC | Age: 74
End: 2024-12-20
Payer: MEDICARE

## 2024-12-20 VITALS
BODY MASS INDEX: 37.38 KG/M2 | OXYGEN SATURATION: 100 % | RESPIRATION RATE: 18 BRPM | WEIGHT: 224.38 LBS | TEMPERATURE: 98 F | HEART RATE: 76 BPM | SYSTOLIC BLOOD PRESSURE: 142 MMHG | DIASTOLIC BLOOD PRESSURE: 85 MMHG | HEIGHT: 65 IN

## 2024-12-20 DIAGNOSIS — C50.919 MALIGNANT NEOPLASM OF BREAST IN FEMALE, ESTROGEN RECEPTOR POSITIVE, UNSPECIFIED LATERALITY, UNSPECIFIED SITE OF BREAST: ICD-10-CM

## 2024-12-20 DIAGNOSIS — Z17.0 MALIGNANT NEOPLASM OF BREAST IN FEMALE, ESTROGEN RECEPTOR POSITIVE, UNSPECIFIED LATERALITY, UNSPECIFIED SITE OF BREAST: ICD-10-CM

## 2024-12-20 DIAGNOSIS — Z12.31 ENCOUNTER FOR SCREENING MAMMOGRAM FOR MALIGNANT NEOPLASM OF BREAST: ICD-10-CM

## 2024-12-20 DIAGNOSIS — Z79.811 AROMATASE INHIBITOR USE: ICD-10-CM

## 2024-12-20 DIAGNOSIS — Z13.9 ENCOUNTER FOR SCREENING: Primary | ICD-10-CM

## 2024-12-20 PROCEDURE — 99999 PR PBB SHADOW E&M-EST. PATIENT-LVL V: CPT | Mod: PBBFAC,,, | Performed by: NURSE PRACTITIONER

## 2024-12-20 NOTE — PROGRESS NOTES
Visit Information:      Initial Consultation:5/15/17  Referring Physician:Dr Sam Lawson  Other Physicians:  Code Status:Not addressed     Diagnosis/Problem list:   T1bN0M0-Stage I left UOQ breast cancer (Dx 4/17), ER 97%, [place] 90%. HER-2/jenny negative, DCIS component. 0/1SLN+    Present Treatment:  Arimidex 3/2021-present    Treatment history:    1) Left lumpectomy (4/26/17)  2) post lumpectomy XRT (8/14/17-9/13/17)- 5272cGy/21Fx  3) Femara ([id number])    Plan of care:  AI's  x > 5 yrs,        Imaging:    Bone Density 4/19/2023: Low bone mass.    Screening Mammogram with JERRY 4/19/2023: IMPRESSION: BENIGN  There is no mammographic evidence of malignancy.    RECOMMENDATIONS:   A routine screening mammogram in one year in the absence of significant clinical findings in the interval is recommended (April 2024).      Pathology:    4/26/17: [1]  LEFT BREAST, LUMPECTOMY:   A)  INVASIVE DUCTAL CARCINOMA, GRADE I (MODIFIED BLOOM-NOGUEIRA).  --  Greatest tumor dimension, 0.8 cm.  --  Tubule score:  2  --  Nuclear score: 1  --  Mitotic score:1 (2 mitoses / 10 HPFs).   --  Total Dylan score:  4/9 (Grade III).   --  Lymphovascular invasion, not identified.  --  Perineural invasion, not identified.  --  Surgical margins, uninvolved.   --  Closest margin, deep, within 2.7 mm from the invasive carcinoma.   --  Distance of invasive carcinoma from the lateral margin, 8 mm.   --  Distance of invasive carcinoma from the medial margin, 12.5 mm.     NOTE: The deep and encompassing margin was reexcised (specimen #3).  B)  DUCTAL CARCINOMA IN SITU, CRIBRIFORM AND SOLID, NUCLEAR GRADE 1.  --  Greatest dimension of DCIS, 0.2 cm.  --  Surgical margins, uninvolved.   --  Closest margin, medial, 3 mm from the DCIS.    NOTE: This focus is at least 1 cm away from the site of invasive carcinoma.    [2]  SENTINEL LYMPH NODE, LYMPHADENECTOMY:   ONE REACTIVE LYMPH NODE.  NO EVIDENCE OF NEOPLASM (0/1).  [3]  LEFT BREAST, RE-EXCISION OF THE  DEEP AND ENCOMPASSING MARGIN:  A)  NO EVIDENCE OF INVASIVE CARCINOMA OR DCIS.   B)  FOCAL ATYPICAL DUCTAL HYPERPLASIA.   C)  FIBROCYSTIC CHANGE.     STAGE (pTNM) (Note M):           TNM Descriptors:  Not applicable         Primary Tumor (Invasive Carcinoma) (pT):  PT1b:  Tumor > 5 mm but <= 10 mm in greatest dimension         Regional Lymph Nodes (pN) (choose a category based on lymph nodes received with the specimen;  immunohistochemistry and / or molecular studies are not required):                 Category (pN):  PN0: No regional lymph node metastasis identified histologically         Distant Metastasis (pM):  Not applicable   ADDITIONAL NON-TUMOR:  Additional Pathologic Findings:  Atypical ductal hyperplasia, Fibrocystic change.       4/3/17: LEFT BREAST LESION, UPPER OUTER QUADRANT: INVASIVE DUCTAL CARCINOMA, BLOOM NOGUEIRA GRADE 1.  DUCTAL CARCINOMA IN SITU, LOW GRADE SOLID TYPE.   COMMENT:  DCIS is present on all four biopsies, the largest focus measuring 0.7 cm.  Invasive carcinoma is present on three of the core biopsies, the largest focus measuring 0.5 cm. The results of ER, ME and Her2 jenny analysis will be the subject of an addendum report.  This case was reviewed in intradepartmental consultation.       Breast Tumor Prognostic Profile    TEST DESCRIPTION RESULT  INTERPRETATION     Estrogen Receptor    97.36 %   Positive     Progesterone Receptor    90.13 %   Positive     [id number]/c-erb-2 oncoprotein   1+   Negative        CLINICAL HISTORY:       Patient:Ms Law is a pleasant lady kindly referred by Dr. Lawson, diagnosed with breast cancer when she was evaluated for an abnormal mammogram. On 2/8/17 patient underwent routine screening mammogram that was read first BI-RADS Category 2: Benign but Upon further review, asymmetry within the upper outer left breast posteriorly most conspicuous on the exaggerated CC lateral and no prior exaggerated CC lateral available for comparison to determine  stability. It was changed to Cat 0: Further evaluation with spot compression views recommended. On 3/20/17 further views showed Suspicious 9 mm spiculated mass in the left upper outer quadrant. Nodular densities in the 6-7 o'clock left breast and left upper outer quadrant. BIRADS Category 0.  Incomplete.  RECOMMENDATION:  Needs further imaging evaluation.  Targeted left breast ultrasound is recommended for further evaluation. Given the appearance of the spiculated mass in the left breast, biopsy will likely be necessary.       On 4/3/17: left breast u/s showed an 8mm irregular hypoechoic lesion in the 2:00 left breast, correlating with the spiculated mass seen in the left upper outer quadrant on recent diagnostic mammogram. BIRADS Category 4: Suspicious. RECOMMENDATION: Ultrasound guided core needle biopsy of the suspicious mass in the 2:00 left breast is recommended. It was performed same day by Dr Eros Blank. pathology report LEFT BREAST LESION, UPPER OUTER QUADRANT: INVASIVE DUCTAL CARCINOMA, BLOOM NOGUEIRA GRADE 1. DUCTAL CARCINOMA IN SITU, LOW GRADE SOLID TYPE.  DCIS is present on all four biopsies, the largest focus measuring 0.7 cm.  Invasive carcinoma is present on three of the core biopsies, the largest focus measuring 0.5 cm.    She underwent left breast ultrasound-guided lumpectomy with left axillary sentinel node biopsy on 4/26/17 performed by Dr. Sam Lawson. Pathology showed INVASIVE DUCTAL CARCINOMA, GRADE I (MODIFIED BLOOM-NOGUEIRA).  Greatest tumor dimension, 0.8 cm. Lymphovascular invasion, not identified.  Perineural invasion, not identified. Surgical margins, uninvolved. DUCTAL CARCINOMA IN SITU, CRIBRIFORM AND SOLID, NUCLEAR GRADE 1.  Greatest dimension of DCIS, 0.2 cm.  SENTINEL LYMPH NODE, LYMPHADENECTOMY: ONE REACTIVE LYMPH NODE.  NO EVIDENCE OF NEOPLASM (0/1). FOCAL ATYPICAL DUCTAL HYPERPLASIA.     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 She have a trip coming soon  to [place]. She is leaving early June.    Patient is feeling well today and does not have any complaints at all. She denies any fever, chills, sweats. No chest pain or shortness of breath. No changes in bowel habits. No bleeding.     Completed postlumpectomy XRT 9/17     Patient reports that her anemia has been extensively workup by Dr. Saavedra in [place] in the past and she was diasgnosed with anemia of chronic disease and she does  not want workup for anemia.     Left breast mammogram/US 2/13/2020  IMPRESSION: SUSPICIOUS OF MALIGNANCY  Oval mass in the 2:00 left breast is suspicious.  OVERALL STUDY BIRADS: 4 Suspicious for malignancy    Subsequent biopsy on 3/6/20 was NEGATIVE for malignancy.        Bone density on 4/19/23 shows low bone mass but osteopenia now.        Chief Complaint: 6 Month Follow Up      Interval History:    Patient presents today  for 6 month f/u. She is taking Arimidex and tolerating well. Last screening vasile bilateral breast on 4/18/2024: was BIRADS 2 benign.  She is on Prolia every 6 months - she received it yesterday. She denies any changes in self breast exams. She completed PT for balance training associated with vertigo and now she continues with PT 3 days per weeks for wellness program.         ROS: All 14 points ROS taken and positive as per Interval History, all other negative.  Review of Systems   Constitutional:  Negative for chills, fever and weight loss.   HENT:  Negative for congestion and nosebleeds.    Eyes:  Negative for blurred vision, double vision and photophobia.   Respiratory:  Negative for cough, hemoptysis and shortness of breath.    Cardiovascular:  Negative for chest pain, palpitations, leg swelling and PND.   Gastrointestinal:  Negative for abdominal pain, blood in stool, constipation, diarrhea, melena, nausea and vomiting.   Genitourinary:  Negative for dysuria, frequency, hematuria and urgency.   Musculoskeletal:  Positive for joint pain. Negative for back  "pain, falls and myalgias.   Skin:  Negative for itching and rash.   Neurological:  Positive for dizziness. Negative for tremors, focal weakness, seizures, weakness and headaches.   Endo/Heme/Allergies:  Negative for environmental allergies. Does not bruise/bleed easily.   Psychiatric/Behavioral:  Negative for depression and suicidal ideas. The patient is not nervous/anxious.          Histories:  PMH/PSH/FH/SOCIAL/ALLERGIES AND MEDS REVIEWED AND UPDATED AS APPROPRIATE       Vitals:    12/20/24 0930   BP: (!) 142/85   BP Location: Left arm   Patient Position: Sitting   Pulse: 76   Resp: 18   Temp: 97.9 °F (36.6 °C)   TempSrc: Oral   SpO2: 100%   Weight: 101.8 kg (224 lb 6.4 oz)   Height: 5' 5" (1.651 m)          Wt Readings from Last 6 Encounters:   12/20/24 101.8 kg (224 lb 6.4 oz)   06/13/24 100.7 kg (222 lb)   11/29/23 99 kg (218 lb 3.2 oz)   05/08/23 99.8 kg (220 lb)   04/26/23 100.7 kg (222 lb 1.6 oz)   01/19/23 103.4 kg (228 lb)     Body mass index is 37.34 kg/m².  Body surface area is 2.16 meters squared.  Physical Exam  Constitutional:       Appearance: Normal appearance.   Pulmonary:      Effort: Pulmonary effort is normal.   Neurological:      Mental Status: She is oriented to person, place, and time.   Psychiatric:         Mood and Affect: Mood normal.         Behavior: Behavior normal.       ECOG SCORE             Laboratory:  CBC with Differential:  Lab Results   Component Value Date    WBC 7.36 11/26/2024    RBC 3.52 (L) 11/26/2024    HGB 11.0 (L) 11/26/2024    HCT 33.4 (L) 11/26/2024    MCV 94.9 (H) 11/26/2024    MCH 31.3 (H) 11/26/2024    MCHC 32.9 (L) 11/26/2024    RDW 12.5 11/26/2024     11/26/2024    MPV 9.5 11/26/2024        CMP:  Sodium   Date Value Ref Range Status   11/26/2024 139 136 - 145 mmol/L Final     Potassium   Date Value Ref Range Status   11/26/2024 4.4 3.5 - 5.1 mmol/L Final     Chloride   Date Value Ref Range Status   11/26/2024 105 98 - 107 mmol/L Final     CO2   Date Value " Ref Range Status   11/26/2024 25 23 - 31 mmol/L Final     Blood Urea Nitrogen   Date Value Ref Range Status   11/26/2024 41.7 (H) 9.8 - 20.1 mg/dL Final     Creatinine   Date Value Ref Range Status   11/26/2024 1.45 (H) 0.55 - 1.02 mg/dL Final     Calcium   Date Value Ref Range Status   11/26/2024 10.1 8.4 - 10.2 mg/dL Final     Albumin   Date Value Ref Range Status   11/26/2024 3.8 3.4 - 4.8 g/dL Final     Bilirubin Total   Date Value Ref Range Status   11/26/2024 0.2 <=1.5 mg/dL Final     ALP   Date Value Ref Range Status   11/26/2024 74 40 - 150 unit/L Final     AST   Date Value Ref Range Status   11/26/2024 18 5 - 34 unit/L Final     ALT   Date Value Ref Range Status   11/26/2024 16 0 - 55 unit/L Final     Estimated GFR-Non    Date Value Ref Range Status   04/14/2022 46           Assessment:       1) T1bN0M0-Stage I left UOQ breast cancer (Dx 4/17)   --ER 97%, [place] 90%. HER-2/jenny negative,    --Left lumpectomy (4/26/17)   --Left breast infiltrating ductal carcinoma arising from  DCIS   --0/1SLN+   --post lumpectomy XRT (8/14/17-9/13/17)- 5272cGy/21Fx   -- Femara ([id number])--> severe arthralgias   --Arimidex 3/2021-present  Osteoporosis and AI's:  Discussed with the  patient that aromatase inhibitors are well known to decrease endogenous estrogens and cause bone mass loss, osteopenia/osteoporosis, and increase the risk of pathological fractures, therfore, bone health is very important. She may need to be on medications for bone loss prevention/treatment. Bone health that includes calcium 0648-7684 mg/day with vit D supplementation as well as weight bearing exercise to reduce the risk of pathological fractures. Discussed bone density tests, the significance of it and that it will be performed q 2 years or as indicated to evaluate her bone mass. Also discussed fall precautions. The patient was given ample opportunity to ask questions and they were all answered to her satisfaction.  Pt on  Prolia.  Most recent bone density with osteopenia  2) Anemia--due to chronic disease  3) Comorbidities: CKD, DM, HTN, Hypothyroidism        Plan:       Patient is doing well and with no mammographic evidence of disease.  We will continue present treatment.  Continue Arimidex  Continue Prolia every 6 months   Mammogram due 4/2025 - ordered  Bone density due in 4/2025 - ordered  Continue Calcium + Vit D and weight bearing exercises  Keep follow ups with other physicians  RTC 6 months with labs prior (CBC, CMP, CA 27-29)- Prolia same day as visit      Encouraged to call with questions or problems  The patient was given ample opportunity to ask questions and they were all answered to satisfaction; patient demonstrated understanding of what we discussed and is agreeable to the plan.    REMY Montilla

## 2025-01-02 DIAGNOSIS — C50.919 MALIGNANT NEOPLASM OF BREAST IN FEMALE, ESTROGEN RECEPTOR POSITIVE, UNSPECIFIED LATERALITY, UNSPECIFIED SITE OF BREAST: ICD-10-CM

## 2025-01-02 DIAGNOSIS — Z17.0 MALIGNANT NEOPLASM OF BREAST IN FEMALE, ESTROGEN RECEPTOR POSITIVE, UNSPECIFIED LATERALITY, UNSPECIFIED SITE OF BREAST: ICD-10-CM

## 2025-01-02 RX ORDER — ANASTROZOLE 1 MG/1
1 TABLET ORAL DAILY
Qty: 30 TABLET | Refills: 5 | Status: SHIPPED | OUTPATIENT
Start: 2025-01-02

## 2025-01-30 ENCOUNTER — LAB VISIT (OUTPATIENT)
Dept: LAB | Facility: HOSPITAL | Age: 75
End: 2025-01-30
Attending: INTERNAL MEDICINE
Payer: MEDICARE

## 2025-01-30 DIAGNOSIS — N18.31 CHRONIC KIDNEY DISEASE (CKD) STAGE G3A/A1, MODERATELY DECREASED GLOMERULAR FILTRATION RATE (GFR) BETWEEN 45-59 ML/MIN/1.73 SQUARE METER AND ALBUMINURIA CREATININE RATIO LESS THAN 30 MG/G: Primary | ICD-10-CM

## 2025-01-30 DIAGNOSIS — E11.22 TYPE 2 DIABETES MELLITUS WITH END-STAGE RENAL DISEASE: ICD-10-CM

## 2025-01-30 DIAGNOSIS — N18.6 TYPE 2 DIABETES MELLITUS WITH END-STAGE RENAL DISEASE: ICD-10-CM

## 2025-01-30 DIAGNOSIS — I10 ESSENTIAL HYPERTENSION, MALIGNANT: ICD-10-CM

## 2025-01-30 LAB
ALBUMIN SERPL-MCNC: 3.6 G/DL (ref 3.4–4.8)
ALBUMIN/GLOB SERPL: 1.1 RATIO (ref 1.1–2)
ALP SERPL-CCNC: 71 UNIT/L (ref 40–150)
ALT SERPL-CCNC: 15 UNIT/L (ref 0–55)
ANION GAP SERPL CALC-SCNC: 7 MEQ/L
AST SERPL-CCNC: 14 UNIT/L (ref 5–34)
BILIRUB SERPL-MCNC: 0.3 MG/DL
BUN SERPL-MCNC: 40.7 MG/DL (ref 9.8–20.1)
CALCIUM SERPL-MCNC: 9.2 MG/DL (ref 8.4–10.2)
CHLORIDE SERPL-SCNC: 107 MMOL/L (ref 98–107)
CO2 SERPL-SCNC: 26 MMOL/L (ref 23–31)
CREAT SERPL-MCNC: 1.27 MG/DL (ref 0.55–1.02)
CREAT UR-MCNC: 107.7 MG/DL (ref 45–106)
CREAT/UREA NIT SERPL: 32
GFR SERPLBLD CREATININE-BSD FMLA CKD-EPI: 45 ML/MIN/1.73/M2
GLOBULIN SER-MCNC: 3.3 GM/DL (ref 2.4–3.5)
GLUCOSE SERPL-MCNC: 153 MG/DL (ref 82–115)
MAGNESIUM SERPL-MCNC: 2.2 MG/DL (ref 1.6–2.6)
PHOSPHATE SERPL-MCNC: 3.4 MG/DL (ref 2.3–4.7)
POTASSIUM SERPL-SCNC: 4.5 MMOL/L (ref 3.5–5.1)
PROT SERPL-MCNC: 6.9 GM/DL (ref 5.8–7.6)
PROT UR STRIP-MCNC: 22.4 MG/DL
SODIUM SERPL-SCNC: 140 MMOL/L (ref 136–145)
URATE SERPL-MCNC: 7.6 MG/DL (ref 2.6–6)
URINE PROTEIN/CREATININE RATIO (OLG): 0.2

## 2025-01-30 PROCEDURE — 84100 ASSAY OF PHOSPHORUS: CPT

## 2025-01-30 PROCEDURE — 82570 ASSAY OF URINE CREATININE: CPT

## 2025-01-30 PROCEDURE — 36415 COLL VENOUS BLD VENIPUNCTURE: CPT

## 2025-01-30 PROCEDURE — 80053 COMPREHEN METABOLIC PANEL: CPT

## 2025-01-30 PROCEDURE — 83735 ASSAY OF MAGNESIUM: CPT

## 2025-01-30 PROCEDURE — 84550 ASSAY OF BLOOD/URIC ACID: CPT

## 2025-04-22 ENCOUNTER — HOSPITAL ENCOUNTER (OUTPATIENT)
Dept: RADIOLOGY | Facility: HOSPITAL | Age: 75
Discharge: HOME | End: 2025-04-22
Attending: NURSE PRACTITIONER
Payer: MEDICARE

## 2025-04-22 DIAGNOSIS — Z12.31 ENCOUNTER FOR SCREENING MAMMOGRAM FOR MALIGNANT NEOPLASM OF BREAST: ICD-10-CM

## 2025-04-22 DIAGNOSIS — Z13.9 ENCOUNTER FOR SCREENING: ICD-10-CM

## 2025-04-22 PROCEDURE — 77063 BREAST TOMOSYNTHESIS BI: CPT | Mod: TC

## 2025-06-19 ENCOUNTER — OFFICE VISIT (OUTPATIENT)
Dept: HEMATOLOGY/ONCOLOGY | Facility: CLINIC | Age: 75
End: 2025-06-19
Payer: MEDICARE

## 2025-06-19 ENCOUNTER — LAB VISIT (OUTPATIENT)
Dept: LAB | Facility: HOSPITAL | Age: 75
End: 2025-06-19
Attending: NURSE PRACTITIONER
Payer: MEDICARE

## 2025-06-19 ENCOUNTER — INFUSION (OUTPATIENT)
Dept: INFUSION THERAPY | Facility: HOSPITAL | Age: 75
End: 2025-06-19
Attending: INTERNAL MEDICINE
Payer: MEDICARE

## 2025-06-19 VITALS
TEMPERATURE: 97 F | OXYGEN SATURATION: 97 % | HEART RATE: 86 BPM | SYSTOLIC BLOOD PRESSURE: 148 MMHG | WEIGHT: 218.81 LBS | HEIGHT: 65 IN | BODY MASS INDEX: 36.46 KG/M2 | RESPIRATION RATE: 18 BRPM | DIASTOLIC BLOOD PRESSURE: 75 MMHG

## 2025-06-19 DIAGNOSIS — Z13.9 ENCOUNTER FOR SCREENING: ICD-10-CM

## 2025-06-19 DIAGNOSIS — Z17.0 MALIGNANT NEOPLASM OF BREAST IN FEMALE, ESTROGEN RECEPTOR POSITIVE, UNSPECIFIED LATERALITY, UNSPECIFIED SITE OF BREAST: Primary | ICD-10-CM

## 2025-06-19 DIAGNOSIS — M81.0 OSTEOPOROSIS, UNSPECIFIED OSTEOPOROSIS TYPE, UNSPECIFIED PATHOLOGICAL FRACTURE PRESENCE: Primary | ICD-10-CM

## 2025-06-19 DIAGNOSIS — C50.919 MALIGNANT NEOPLASM OF BREAST IN FEMALE, ESTROGEN RECEPTOR POSITIVE, UNSPECIFIED LATERALITY, UNSPECIFIED SITE OF BREAST: ICD-10-CM

## 2025-06-19 DIAGNOSIS — Z17.0 MALIGNANT NEOPLASM OF BREAST IN FEMALE, ESTROGEN RECEPTOR POSITIVE, UNSPECIFIED LATERALITY, UNSPECIFIED SITE OF BREAST: ICD-10-CM

## 2025-06-19 DIAGNOSIS — Z12.31 ENCOUNTER FOR SCREENING MAMMOGRAM FOR MALIGNANT NEOPLASM OF BREAST: ICD-10-CM

## 2025-06-19 DIAGNOSIS — E66.01 MORBID (SEVERE) OBESITY DUE TO EXCESS CALORIES: ICD-10-CM

## 2025-06-19 DIAGNOSIS — Z79.811 AROMATASE INHIBITOR USE: ICD-10-CM

## 2025-06-19 DIAGNOSIS — M81.8 OTHER OSTEOPOROSIS WITHOUT CURRENT PATHOLOGICAL FRACTURE: ICD-10-CM

## 2025-06-19 DIAGNOSIS — C50.919 MALIGNANT NEOPLASM OF BREAST IN FEMALE, ESTROGEN RECEPTOR POSITIVE, UNSPECIFIED LATERALITY, UNSPECIFIED SITE OF BREAST: Primary | ICD-10-CM

## 2025-06-19 LAB
ALBUMIN SERPL-MCNC: 3.6 G/DL (ref 3.4–4.8)
ALBUMIN/GLOB SERPL: 1 RATIO (ref 1.1–2)
ALP SERPL-CCNC: 69 UNIT/L (ref 40–150)
ALT SERPL-CCNC: 15 UNIT/L (ref 0–55)
ANION GAP SERPL CALC-SCNC: 8 MEQ/L
AST SERPL-CCNC: 17 UNIT/L (ref 11–45)
BASOPHILS # BLD AUTO: 0.04 X10(3)/MCL
BASOPHILS NFR BLD AUTO: 0.7 %
BILIRUB SERPL-MCNC: 0.3 MG/DL
BUN SERPL-MCNC: 28.6 MG/DL (ref 9.8–20.1)
CALCIUM SERPL-MCNC: 9.9 MG/DL (ref 8.4–10.2)
CHLORIDE SERPL-SCNC: 105 MMOL/L (ref 98–107)
CO2 SERPL-SCNC: 26 MMOL/L (ref 23–31)
CREAT SERPL-MCNC: 1.26 MG/DL (ref 0.55–1.02)
CREAT/UREA NIT SERPL: 23
EOSINOPHIL # BLD AUTO: 0.21 X10(3)/MCL (ref 0–0.9)
EOSINOPHIL NFR BLD AUTO: 3.5 %
ERYTHROCYTE [DISTWIDTH] IN BLOOD BY AUTOMATED COUNT: 12.9 % (ref 11.5–17)
GFR SERPLBLD CREATININE-BSD FMLA CKD-EPI: 45 ML/MIN/1.73/M2
GLOBULIN SER-MCNC: 3.6 GM/DL (ref 2.4–3.5)
GLUCOSE SERPL-MCNC: 192 MG/DL (ref 82–115)
HCT VFR BLD AUTO: 31.1 % (ref 37–47)
HGB BLD-MCNC: 10.2 G/DL (ref 12–16)
IMM GRANULOCYTES # BLD AUTO: 0 X10(3)/MCL (ref 0–0.04)
IMM GRANULOCYTES NFR BLD AUTO: 0 %
LYMPHOCYTES # BLD AUTO: 1.86 X10(3)/MCL (ref 0.6–4.6)
LYMPHOCYTES NFR BLD AUTO: 31.2 %
MCH RBC QN AUTO: 31.3 PG (ref 27–31)
MCHC RBC AUTO-ENTMCNC: 32.8 G/DL (ref 33–36)
MCV RBC AUTO: 95.4 FL (ref 80–94)
MONOCYTES # BLD AUTO: 0.4 X10(3)/MCL (ref 0.1–1.3)
MONOCYTES NFR BLD AUTO: 6.7 %
NEUTROPHILS # BLD AUTO: 3.45 X10(3)/MCL (ref 2.1–9.2)
NEUTROPHILS NFR BLD AUTO: 57.9 %
PLATELET # BLD AUTO: 225 X10(3)/MCL (ref 130–400)
PMV BLD AUTO: 10 FL (ref 7.4–10.4)
POTASSIUM SERPL-SCNC: 3.9 MMOL/L (ref 3.5–5.1)
PROT SERPL-MCNC: 7.2 GM/DL (ref 5.8–7.6)
RBC # BLD AUTO: 3.26 X10(6)/MCL (ref 4.2–5.4)
SODIUM SERPL-SCNC: 139 MMOL/L (ref 136–145)
WBC # BLD AUTO: 5.96 X10(3)/MCL (ref 4.5–11.5)

## 2025-06-19 PROCEDURE — 96372 THER/PROPH/DIAG INJ SC/IM: CPT

## 2025-06-19 PROCEDURE — 3288F FALL RISK ASSESSMENT DOCD: CPT | Mod: CPTII,S$GLB,, | Performed by: NURSE PRACTITIONER

## 2025-06-19 PROCEDURE — 1101F PT FALLS ASSESS-DOCD LE1/YR: CPT | Mod: CPTII,S$GLB,, | Performed by: NURSE PRACTITIONER

## 2025-06-19 PROCEDURE — 99999 PR PBB SHADOW E&M-EST. PATIENT-LVL IV: CPT | Mod: PBBFAC,,, | Performed by: NURSE PRACTITIONER

## 2025-06-19 PROCEDURE — 99214 OFFICE O/P EST MOD 30 MIN: CPT | Mod: S$GLB,,, | Performed by: NURSE PRACTITIONER

## 2025-06-19 PROCEDURE — 3008F BODY MASS INDEX DOCD: CPT | Mod: CPTII,S$GLB,, | Performed by: NURSE PRACTITIONER

## 2025-06-19 PROCEDURE — 86300 IMMUNOASSAY TUMOR CA 15-3: CPT

## 2025-06-19 PROCEDURE — 36415 COLL VENOUS BLD VENIPUNCTURE: CPT

## 2025-06-19 PROCEDURE — 85025 COMPLETE CBC W/AUTO DIFF WBC: CPT

## 2025-06-19 PROCEDURE — G2211 COMPLEX E/M VISIT ADD ON: HCPCS | Mod: S$GLB,,, | Performed by: NURSE PRACTITIONER

## 2025-06-19 PROCEDURE — 4010F ACE/ARB THERAPY RXD/TAKEN: CPT | Mod: CPTII,S$GLB,, | Performed by: NURSE PRACTITIONER

## 2025-06-19 PROCEDURE — 1126F AMNT PAIN NOTED NONE PRSNT: CPT | Mod: CPTII,S$GLB,, | Performed by: NURSE PRACTITIONER

## 2025-06-19 PROCEDURE — 63600175 PHARM REV CODE 636 W HCPCS: Mod: JZ,TB | Performed by: NURSE PRACTITIONER

## 2025-06-19 PROCEDURE — 80053 COMPREHEN METABOLIC PANEL: CPT

## 2025-06-19 PROCEDURE — 3077F SYST BP >= 140 MM HG: CPT | Mod: CPTII,S$GLB,, | Performed by: NURSE PRACTITIONER

## 2025-06-19 PROCEDURE — 3078F DIAST BP <80 MM HG: CPT | Mod: CPTII,S$GLB,, | Performed by: NURSE PRACTITIONER

## 2025-06-19 PROCEDURE — 1159F MED LIST DOCD IN RCRD: CPT | Mod: CPTII,S$GLB,, | Performed by: NURSE PRACTITIONER

## 2025-06-19 RX ADMIN — DENOSUMAB 60 MG: 60 INJECTION SUBCUTANEOUS at 01:06

## 2025-06-19 NOTE — PROGRESS NOTES
Visit Information:      Initial Consultation:5/15/17  Referring Physician:Dr Sam Lawson  Other Physicians:  Code Status:Not addressed     Diagnosis/Problem list:   T1bN0M0-Stage I left UOQ breast cancer (Dx 4/17), ER 97%, NV 90%. HER-2/jenny negative, DCIS component. 0/1SLN+  Anemia    Present Treatment:  Arimidex 3/2021-present    Treatment history:    1) Left lumpectomy (4/26/17)  2) post lumpectomy XRT (8/14/17-9/13/17)- 5272cGy/21Fx  3) Femara ([id number])    Plan of care:  AI's  x > 5 yrs,        Imaging:    Bone Density 4/19/2023: Low bone mass.    Screening Mammogram with JERRY 4/19/2023: IMPRESSION: BENIGN  There is no mammographic evidence of malignancy.    RECOMMENDATIONS:   A routine screening mammogram in one year in the absence of significant clinical findings in the interval is recommended (April 2024).      Pathology:    4/26/17: [1]  LEFT BREAST, LUMPECTOMY:   A)  INVASIVE DUCTAL CARCINOMA, GRADE I (MODIFIED BLOOM-NOGUEIRA).  --  Greatest tumor dimension, 0.8 cm.  --  Tubule score:  2  --  Nuclear score: 1  --  Mitotic score:1 (2 mitoses / 10 HPFs).   --  Total Dylan score:  4/9 (Grade III).   --  Lymphovascular invasion, not identified.  --  Perineural invasion, not identified.  --  Surgical margins, uninvolved.   --  Closest margin, deep, within 2.7 mm from the invasive carcinoma.   --  Distance of invasive carcinoma from the lateral margin, 8 mm.   --  Distance of invasive carcinoma from the medial margin, 12.5 mm.     NOTE: The deep and encompassing margin was reexcised (specimen #3).  B)  DUCTAL CARCINOMA IN SITU, CRIBRIFORM AND SOLID, NUCLEAR GRADE 1.  --  Greatest dimension of DCIS, 0.2 cm.  --  Surgical margins, uninvolved.   --  Closest margin, medial, 3 mm from the DCIS.    NOTE: This focus is at least 1 cm away from the site of invasive carcinoma.    [2]  SENTINEL LYMPH NODE, LYMPHADENECTOMY:   ONE REACTIVE LYMPH NODE.  NO EVIDENCE OF NEOPLASM (0/1).  [3]  LEFT BREAST,  RE-EXCISION OF THE DEEP AND ENCOMPASSING MARGIN:  A)  NO EVIDENCE OF INVASIVE CARCINOMA OR DCIS.   B)  FOCAL ATYPICAL DUCTAL HYPERPLASIA.   C)  FIBROCYSTIC CHANGE.     STAGE (pTNM) (Note M):           TNM Descriptors:  Not applicable         Primary Tumor (Invasive Carcinoma) (pT):  PT1b:  Tumor > 5 mm but <= 10 mm in greatest dimension         Regional Lymph Nodes (pN) (choose a category based on lymph nodes received with the specimen;  immunohistochemistry and / or molecular studies are not required):                 Category (pN):  PN0: No regional lymph node metastasis identified histologically         Distant Metastasis (pM):  Not applicable   ADDITIONAL NON-TUMOR:  Additional Pathologic Findings:  Atypical ductal hyperplasia, Fibrocystic change.       4/3/17: LEFT BREAST LESION, UPPER OUTER QUADRANT: INVASIVE DUCTAL CARCINOMA, BLOOM NOGUEIRA GRADE 1.  DUCTAL CARCINOMA IN SITU, LOW GRADE SOLID TYPE.   COMMENT:  DCIS is present on all four biopsies, the largest focus measuring 0.7 cm.  Invasive carcinoma is present on three of the core biopsies, the largest focus measuring 0.5 cm. The results of ER, SC and Her2 jenny analysis will be the subject of an addendum report.  This case was reviewed in intradepartmental consultation.       Breast Tumor Prognostic Profile    TEST DESCRIPTION RESULT  INTERPRETATION     Estrogen Receptor    97.36 %   Positive     Progesterone Receptor    90.13 %   Positive     Qdn2rdn/c-erb-2 oncoprotein   1+   Negative        CLINICAL HISTORY:       Patient:Ms Law is a pleasant lady kindly referred by Dr. Lawson, diagnosed with breast cancer when she was evaluated for an abnormal mammogram. On 2/8/17 patient underwent routine screening mammogram that was read first BI-RADS Category 2: Benign but Upon further review, asymmetry within the upper outer left breast posteriorly most conspicuous on the exaggerated CC lateral and no prior exaggerated CC lateral available for comparison to  determine stability. It was changed to Cat 0: Further evaluation with spot compression views recommended. On 3/20/17 further views showed Suspicious 9 mm spiculated mass in the left upper outer quadrant. Nodular densities in the 6-7 o'clock left breast and left upper outer quadrant. BIRADS Category 0.  Incomplete.  RECOMMENDATION:  Needs further imaging evaluation.  Targeted left breast ultrasound is recommended for further evaluation. Given the appearance of the spiculated mass in the left breast, biopsy will likely be necessary.       On 4/3/17: left breast u/s showed an 8mm irregular hypoechoic lesion in the 2:00 left breast, correlating with the spiculated mass seen in the left upper outer quadrant on recent diagnostic mammogram. BIRADS Category 4: Suspicious. RECOMMENDATION: Ultrasound guided core needle biopsy of the suspicious mass in the 2:00 left breast is recommended. It was performed same day by Dr Eros Blank. pathology report LEFT BREAST LESION, UPPER OUTER QUADRANT: INVASIVE DUCTAL CARCINOMA, BLOOM NOGUEIRA GRADE 1. DUCTAL CARCINOMA IN SITU, LOW GRADE SOLID TYPE.  DCIS is present on all four biopsies, the largest focus measuring 0.7 cm.  Invasive carcinoma is present on three of the core biopsies, the largest focus measuring 0.5 cm.    She underwent left breast ultrasound-guided lumpectomy with left axillary sentinel node biopsy on 4/26/17 performed by Dr. Sam Lawson. Pathology showed INVASIVE DUCTAL CARCINOMA, GRADE I (MODIFIED BLOOM-NOGUEIRA).  Greatest tumor dimension, 0.8 cm. Lymphovascular invasion, not identified.  Perineural invasion, not identified. Surgical margins, uninvolved. DUCTAL CARCINOMA IN SITU, CRIBRIFORM AND SOLID, NUCLEAR GRADE 1.  Greatest dimension of DCIS, 0.2 cm.  SENTINEL LYMPH NODE, LYMPHADENECTOMY: ONE REACTIVE LYMPH NODE.  NO EVIDENCE OF NEOPLASM (0/1). FOCAL ATYPICAL DUCTAL HYPERPLASIA.     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 She have a trip  "coming soon to [place]. She is leaving early June.    Patient is feeling well today and does not have any complaints at all. She denies any fever, chills, sweats. No chest pain or shortness of breath. No changes in bowel habits. No bleeding.     Completed postlumpectomy XRT 9/17     Patient reports that her anemia has been extensively workup by Dr. Saavedra in [place] in the past and she was diasgnosed with anemia of chronic disease and she does  not want workup for anemia.     Left breast mammogram/US 2/13/2020  IMPRESSION: SUSPICIOUS OF MALIGNANCY  Oval mass in the 2:00 left breast is suspicious.  OVERALL STUDY BIRADS: 4 Suspicious for malignancy    Subsequent biopsy on 3/6/20 was NEGATIVE for malignancy.        Bone density on 4/19/23 shows low bone mass but osteopenia now.        Chief Complaint: 6 Month Follow Up      Interval History:    Patient presents today  for 6 month f/u. She is taking Arimidex and tolerating well. Last screening vasile bilateral breast on 04/27/25: was BIRADS 2 benign.  She is on Prolia every 6 months - due today. She denies any changes in self breast exams.       ROS: All 14 points ROS taken and positive as per Interval History, all other negative.  Review of Systems   Respiratory:  Negative for cough and shortness of breath.    Cardiovascular:  Negative for chest pain.   Gastrointestinal:  Negative for abdominal pain and diarrhea.   Genitourinary:  Positive for frequency.   Musculoskeletal:  Negative for back pain.   Skin:  Negative for rash.   Neurological:  Negative for headaches.   Psychiatric/Behavioral:  The patient is not nervous/anxious.          Histories:  PMH/PSH/FH/SOCIAL/ALLERGIES AND MEDS REVIEWED AND UPDATED AS APPROPRIATE       Vitals:    06/19/25 1248   BP: (!) 148/75   BP Location: Left arm   Patient Position: Sitting   Pulse: 86   Resp: 18   Temp: 97.4 °F (36.3 °C)   TempSrc: Oral   SpO2: 97%   Weight: 99.2 kg (218 lb 12.8 oz)   Height: 5' 5" (1.651 m)        "   Wt Readings from Last 6 Encounters:   06/19/25 99.2 kg (218 lb 12.8 oz)   12/20/24 101.8 kg (224 lb 6.4 oz)   06/13/24 100.7 kg (222 lb)   11/29/23 99 kg (218 lb 3.2 oz)   05/08/23 99.8 kg (220 lb)   04/26/23 100.7 kg (222 lb 1.6 oz)     Body mass index is 36.41 kg/m².  Body surface area is 2.13 meters squared.  Physical Exam  Constitutional:       Appearance: Normal appearance.   Pulmonary:      Effort: Pulmonary effort is normal.   Chest:      Comments: Left breast lumpectomy  Neurological:      Mental Status: She is oriented to person, place, and time.   Psychiatric:         Mood and Affect: Mood normal.         Behavior: Behavior normal.       ECOG SCORE             Laboratory:  CBC with Differential:  Lab Results   Component Value Date    WBC 5.96 06/19/2025    RBC 3.26 (L) 06/19/2025    HGB 10.2 (L) 06/19/2025    HCT 31.1 (L) 06/19/2025    MCV 95.4 (H) 06/19/2025    MCH 31.3 (H) 06/19/2025    MCHC 32.8 (L) 06/19/2025    RDW 12.9 06/19/2025     06/19/2025    MPV 10.0 06/19/2025        CMP:  Sodium   Date Value Ref Range Status   01/30/2025 140 136 - 145 mmol/L Final     Potassium   Date Value Ref Range Status   01/30/2025 4.5 3.5 - 5.1 mmol/L Final     Chloride   Date Value Ref Range Status   01/30/2025 107 98 - 107 mmol/L Final     CO2   Date Value Ref Range Status   01/30/2025 26 23 - 31 mmol/L Final     Glucose   Date Value Ref Range Status   01/30/2025 153 (H) 82 - 115 mg/dL Final     Blood Urea Nitrogen   Date Value Ref Range Status   01/30/2025 40.7 (H) 9.8 - 20.1 mg/dL Final     Creatinine   Date Value Ref Range Status   01/30/2025 1.27 (H) 0.55 - 1.02 mg/dL Final     Calcium   Date Value Ref Range Status   01/30/2025 9.2 8.4 - 10.2 mg/dL Final     Protein Total   Date Value Ref Range Status   01/30/2025 6.9 5.8 - 7.6 gm/dL Final     Albumin   Date Value Ref Range Status   01/30/2025 3.6 3.4 - 4.8 g/dL Final     Bilirubin Total   Date Value Ref Range Status   01/30/2025 0.3 <=1.5 mg/dL Final      ALP   Date Value Ref Range Status   01/30/2025 71 40 - 150 unit/L Final     AST   Date Value Ref Range Status   01/30/2025 14 5 - 34 unit/L Final     ALT   Date Value Ref Range Status   01/30/2025 15 0 - 55 unit/L Final     Estimated GFR-Non    Date Value Ref Range Status   04/14/2022 46           Assessment:       1) T1bN0M0-Stage I left UOQ breast cancer (Dx 4/17)   --ER 97%, MD 90%. HER-2/jenny negative,    --Left lumpectomy (4/26/17)   --Left breast infiltrating ductal carcinoma arising from  DCIS   --0/1SLN+   --post lumpectomy XRT (8/14/17-9/13/17)- 5272cGy/21Fx   -- Femara ([id number])--> severe arthralgias   --Arimidex 3/2021-present  Osteoporosis and AI's:  Discussed with the  patient that aromatase inhibitors are well known to decrease endogenous estrogens and cause bone mass loss, osteopenia/osteoporosis, and increase the risk of pathological fractures, therfore, bone health is very important. She may need to be on medications for bone loss prevention/treatment. Bone health that includes calcium 2185-6798 mg/day with vit D supplementation as well as weight bearing exercise to reduce the risk of pathological fractures. Discussed bone density tests, the significance of it and that it will be performed q 2 years or as indicated to evaluate her bone mass. Also discussed fall precautions. The patient was given ample opportunity to ask questions and they were all answered to her satisfaction.  Pt on Prolia.  Most recent bone density with osteopenia  2) Anemia--due to chronic disease  3) Comorbidities: CKD, DM, HTN, Hypothyroidism        Plan:       Patient is doing well and with no mammographic evidence of disease.  We will continue present treatment.  Labs are stable  Continue Arimidex  Continue Prolia every 6 months   Mammogram due 4/2026   Bone density was due in 4/2025 - re-ordered  Continue Calcium + Vit D and weight bearing exercises  Keep follow ups with other physicians  RTC 6 months  with labs prior (CBC, CMP, CA 27-29)- Prolia same day as visit      Encouraged to call with questions or problems  The patient was given ample opportunity to ask questions and they were all answered to satisfaction; patient demonstrated understanding of what we discussed and is agreeable to the plan.    Jackie Benitez, [facility]          - code applied: patient requires or will require a continuous, longitudinal and active collaborative plan of care related to this patient's health condition, Malignant neoplasm of upper-outer quadrant of left breast in female, estrogen receptor positive , the management of which requires the direction of a practitioner with specialized clinical knowledge, skill and expertise.

## 2025-06-20 LAB — CANCER AG27-29 SERPL-ACNC: <12 U/ML

## 2025-07-01 ENCOUNTER — HOSPITAL ENCOUNTER (OUTPATIENT)
Dept: RADIOLOGY | Facility: HOSPITAL | Age: 75
Discharge: HOME | End: 2025-07-01
Attending: NURSE PRACTITIONER
Payer: MEDICARE

## 2025-07-01 DIAGNOSIS — M81.8 OTHER OSTEOPOROSIS WITHOUT CURRENT PATHOLOGICAL FRACTURE: ICD-10-CM

## 2025-07-01 DIAGNOSIS — M80.08XA AGE-RELATED OSTEOPOROSIS WITH CURRENT PATHOL FRACTURE OF VERTEBRA, INITIAL ENCOUNTER: ICD-10-CM

## 2025-07-01 PROCEDURE — 77081 DXA BONE DENSITY APPENDICULR: CPT | Mod: TC

## 2025-07-01 PROCEDURE — 77080 DXA BONE DENSITY AXIAL: CPT | Mod: XU,TC

## 2025-07-10 DIAGNOSIS — C50.919 MALIGNANT NEOPLASM OF BREAST IN FEMALE, ESTROGEN RECEPTOR POSITIVE, UNSPECIFIED LATERALITY, UNSPECIFIED SITE OF BREAST: ICD-10-CM

## 2025-07-10 DIAGNOSIS — Z17.0 MALIGNANT NEOPLASM OF BREAST IN FEMALE, ESTROGEN RECEPTOR POSITIVE, UNSPECIFIED LATERALITY, UNSPECIFIED SITE OF BREAST: ICD-10-CM

## 2025-07-10 RX ORDER — ANASTROZOLE 1 MG/1
1 TABLET ORAL
Qty: 30 TABLET | Refills: 5 | Status: SHIPPED | OUTPATIENT
Start: 2025-07-10

## 2025-08-19 ENCOUNTER — LAB VISIT (OUTPATIENT)
Dept: LAB | Facility: HOSPITAL | Age: 75
End: 2025-08-19
Attending: INTERNAL MEDICINE
Payer: MEDICARE

## 2025-08-19 DIAGNOSIS — E78.2 MIXED HYPERLIPIDEMIA: ICD-10-CM

## 2025-08-19 DIAGNOSIS — N18.6 TYPE 2 DIABETES MELLITUS WITH END-STAGE RENAL DISEASE: ICD-10-CM

## 2025-08-19 DIAGNOSIS — N18.31 CHRONIC KIDNEY DISEASE (CKD) STAGE G3A/A1, MODERATELY DECREASED GLOMERULAR FILTRATION RATE (GFR) BETWEEN 45-59 ML/MIN/1.73 SQUARE METER AND ALBUMINURIA CREATININE RATIO LESS THAN 30 MG/G: Primary | ICD-10-CM

## 2025-08-19 DIAGNOSIS — E11.22 TYPE 2 DIABETES MELLITUS WITH END-STAGE RENAL DISEASE: ICD-10-CM

## 2025-08-19 DIAGNOSIS — D50.8 IRON DEFICIENCY ANEMIA SECONDARY TO INADEQUATE DIETARY IRON INTAKE: ICD-10-CM

## 2025-08-19 LAB
ALBUMIN SERPL-MCNC: 3.7 G/DL (ref 3.4–4.8)
ALBUMIN/GLOB SERPL: 1 RATIO (ref 1.1–2)
ALP SERPL-CCNC: 59 UNIT/L (ref 40–150)
ALT SERPL-CCNC: 16 UNIT/L (ref 0–55)
ANION GAP SERPL CALC-SCNC: 9 MEQ/L
AST SERPL-CCNC: 16 UNIT/L (ref 11–45)
BILIRUB SERPL-MCNC: 0.4 MG/DL
BUN SERPL-MCNC: 56.5 MG/DL (ref 9.8–20.1)
CALCIUM SERPL-MCNC: 9.8 MG/DL (ref 8.4–10.2)
CHLORIDE SERPL-SCNC: 106 MMOL/L (ref 98–107)
CO2 SERPL-SCNC: 25 MMOL/L (ref 23–31)
CREAT SERPL-MCNC: 1.4 MG/DL (ref 0.55–1.02)
CREAT UR-MCNC: 109.9 MG/DL (ref 45–106)
CREAT/UREA NIT SERPL: 40
GFR SERPLBLD CREATININE-BSD FMLA CKD-EPI: 40 ML/MIN/1.73/M2
GLOBULIN SER-MCNC: 3.8 GM/DL (ref 2.4–3.5)
GLUCOSE SERPL-MCNC: 124 MG/DL (ref 82–115)
MAGNESIUM SERPL-MCNC: 2.6 MG/DL (ref 1.6–2.6)
PHOSPHATE SERPL-MCNC: 5.1 MG/DL (ref 2.3–4.7)
POTASSIUM SERPL-SCNC: 4.6 MMOL/L (ref 3.5–5.1)
PROT SERPL-MCNC: 7.5 GM/DL (ref 5.8–7.6)
PROT UR STRIP-MCNC: 22 MG/DL
SODIUM SERPL-SCNC: 140 MMOL/L (ref 136–145)
URATE SERPL-MCNC: 8 MG/DL (ref 2.6–6)
URINE PROTEIN/CREATININE RATIO (OLG): 0.2

## 2025-08-19 PROCEDURE — 84550 ASSAY OF BLOOD/URIC ACID: CPT

## 2025-08-19 PROCEDURE — 36415 COLL VENOUS BLD VENIPUNCTURE: CPT

## 2025-08-19 PROCEDURE — 83735 ASSAY OF MAGNESIUM: CPT

## 2025-08-19 PROCEDURE — 84156 ASSAY OF PROTEIN URINE: CPT

## 2025-08-19 PROCEDURE — 80053 COMPREHEN METABOLIC PANEL: CPT

## 2025-08-19 PROCEDURE — 84100 ASSAY OF PHOSPHORUS: CPT

## (undated) DEVICE — BLOCK BLOX BITE DENT RIM 54FR

## (undated) DEVICE — ADAPTER DUAL NSL LUER M-M 7FT

## (undated) DEVICE — SOL IRRI STRL WATER 1000ML

## (undated) DEVICE — KIT CANIST SUCTION 1200CC

## (undated) DEVICE — TIP SUCTION YANKAUER

## (undated) DEVICE — COLLECTION SPECIMEN NEPTUNE

## (undated) DEVICE — UNDERPAD PROTECT PLUS 17X24IN

## (undated) DEVICE — KIT SURGICAL COLON .25 1.1OZ